# Patient Record
Sex: FEMALE | Race: BLACK OR AFRICAN AMERICAN | NOT HISPANIC OR LATINO | Employment: OTHER | URBAN - METROPOLITAN AREA
[De-identification: names, ages, dates, MRNs, and addresses within clinical notes are randomized per-mention and may not be internally consistent; named-entity substitution may affect disease eponyms.]

---

## 2020-06-11 ENCOUNTER — OFFICE VISIT (OUTPATIENT)
Dept: PODIATRY | Facility: CLINIC | Age: 70
End: 2020-06-11
Payer: MEDICARE

## 2020-06-11 VITALS
SYSTOLIC BLOOD PRESSURE: 137 MMHG | BODY MASS INDEX: 30.82 KG/M2 | HEART RATE: 86 BPM | HEIGHT: 66 IN | WEIGHT: 191.81 LBS | DIASTOLIC BLOOD PRESSURE: 77 MMHG

## 2020-06-11 DIAGNOSIS — M24.572 EQUINUS CONTRACTURE OF LEFT ANKLE: ICD-10-CM

## 2020-06-11 DIAGNOSIS — M79.672 INFLAMMATORY HEEL PAIN, LEFT: ICD-10-CM

## 2020-06-11 DIAGNOSIS — M72.2 PLANTAR FASCIITIS: Primary | ICD-10-CM

## 2020-06-11 PROBLEM — K21.9 GERD (GASTROESOPHAGEAL REFLUX DISEASE): Status: ACTIVE | Noted: 2020-06-11

## 2020-06-11 PROBLEM — I10 HYPERTENSION: Status: ACTIVE | Noted: 2020-06-11

## 2020-06-11 PROBLEM — H26.9 CATARACT: Status: ACTIVE | Noted: 2020-06-11

## 2020-06-11 PROCEDURE — 1125F AMNT PAIN NOTED PAIN PRSNT: CPT | Mod: S$GLB,,, | Performed by: PODIATRIST

## 2020-06-11 PROCEDURE — 99204 OFFICE O/P NEW MOD 45 MIN: CPT | Mod: S$GLB,,, | Performed by: PODIATRIST

## 2020-06-11 PROCEDURE — 1125F PR PAIN SEVERITY QUANTIFIED, PAIN PRESENT: ICD-10-PCS | Mod: S$GLB,,, | Performed by: PODIATRIST

## 2020-06-11 PROCEDURE — 99204 PR OFFICE/OUTPT VISIT, NEW, LEVL IV, 45-59 MIN: ICD-10-PCS | Mod: S$GLB,,, | Performed by: PODIATRIST

## 2020-06-11 PROCEDURE — 1101F PR PT FALLS ASSESS DOC 0-1 FALLS W/OUT INJ PAST YR: ICD-10-PCS | Mod: CPTII,S$GLB,, | Performed by: PODIATRIST

## 2020-06-11 PROCEDURE — 1159F MED LIST DOCD IN RCRD: CPT | Mod: S$GLB,,, | Performed by: PODIATRIST

## 2020-06-11 PROCEDURE — 99999 PR PBB SHADOW E&M-NEW PATIENT-LVL III: CPT | Mod: PBBFAC,,, | Performed by: PODIATRIST

## 2020-06-11 PROCEDURE — 1159F PR MEDICATION LIST DOCUMENTED IN MEDICAL RECORD: ICD-10-PCS | Mod: S$GLB,,, | Performed by: PODIATRIST

## 2020-06-11 PROCEDURE — 1101F PT FALLS ASSESS-DOCD LE1/YR: CPT | Mod: CPTII,S$GLB,, | Performed by: PODIATRIST

## 2020-06-11 PROCEDURE — 99999 PR PBB SHADOW E&M-NEW PATIENT-LVL III: ICD-10-PCS | Mod: PBBFAC,,, | Performed by: PODIATRIST

## 2020-06-11 RX ORDER — SIMVASTATIN 5 MG/1
5 TABLET, FILM COATED ORAL
COMMUNITY
Start: 2020-06-03

## 2020-06-11 RX ORDER — MELOXICAM 15 MG/1
15 TABLET ORAL DAILY
Qty: 30 TABLET | Refills: 0 | Status: SHIPPED | OUTPATIENT
Start: 2020-06-11 | End: 2020-07-02

## 2020-06-11 RX ORDER — FLUTICASONE PROPIONATE 50 MCG
50 SPRAY, SUSPENSION (ML) NASAL
COMMUNITY
Start: 2020-04-08 | End: 2023-02-02

## 2020-06-11 NOTE — PROGRESS NOTES
Subjective:     Patient ID: Basia ERNEE is a 70 y.o. female.    Chief Complaint: Foot Pain (c/o pain to bottom of left heel. rates pain 7/10,  while standing. throbbing and shooting pain. wears causal shoes with socks. non-diabetic Pt. PCP Dr. Cabello, last seen one week ago.)    Basia is a 70 y.o. female who presents to the clinic complaining of heel pain in the left foot, especially with the first step in the morning. The pain is described as Aching, Throbbing and Shooting. The onset of the pain was gradual and has worsened over the past several months. Basia rates the pain as 7/10. She denies a history of trauma. Prior treatments include oral steroid pack with no relief. Patient has no other pedal complaints today.     Patient Active Problem List   Diagnosis    GERD (gastroesophageal reflux disease)    Hypertension    Cataract       Review of patient's allergies indicates:  No Known Allergies    History reviewed. No pertinent surgical history.    Family History   Problem Relation Age of Onset    Hypertension Mother     Hypertension Father        Social History     Socioeconomic History    Marital status:      Spouse name: Not on file    Number of children: Not on file    Years of education: Not on file    Highest education level: Not on file   Occupational History    Not on file   Social Needs    Financial resource strain: Not on file    Food insecurity:     Worry: Not on file     Inability: Not on file    Transportation needs:     Medical: Not on file     Non-medical: Not on file   Tobacco Use    Smoking status: Never Smoker    Smokeless tobacco: Never Used   Substance and Sexual Activity    Alcohol use: Not Currently     Frequency: Never    Drug use: Never    Sexual activity: Not on file   Lifestyle    Physical activity:     Days per week: Not on file     Minutes per session: Not on file    Stress: Not on file   Relationships    Social connections:     Talks on phone: Not on  "file     Gets together: Not on file     Attends [employer] service: Not on file     Active member of club or organization: Not on file     Attends meetings of clubs or organizations: Not on file     Relationship status: Not on file   Other Topics Concern    Not on file   Social History Narrative    Not on file       Vitals:    06/11/20 1540   BP: 137/77   Pulse: 86   Weight: 87 kg (191 lb 12.8 oz)   Height: 5' 6" (1.676 m)   PainSc:   7   PainLoc: Foot       Review of Systems   Constitutional: Negative for chills and fever.   Respiratory: Negative for shortness of breath.    Cardiovascular: Negative for chest pain, palpitations, orthopnea, claudication and leg swelling.   Gastrointestinal: Negative for diarrhea, nausea and vomiting.   Musculoskeletal: Negative for joint pain.   Skin: Negative for rash.   Neurological: Negative for dizziness, tingling, sensory change, focal weakness and weakness.   Psychiatric/Behavioral: Negative.          Objective:   PHYSICAL EXAM: Apperance: Alert and orient in no distress,well developed, and with good attention to grooming and body habits  Patient presents ambulating in casual shoes.  Lower Extremity Exam  VASCULAR: Dorsalis pedis pulses 2/4 bilateral and Posterior Tibial pulses 2/4 bilateral. Capillary fill time <4 seconds bilateral. No edema observed bilateral. Varicosities absent bilateral. Skin temperature of the lower extremities is warm to warm, proximal to distal. Hair growth WNL bilateral.  DERMATOLOGICAL: No skin rashes, subcutaneous nodules, lesions, or ulcers observed bilateral.   NEUROLOGICAL: Light touch, sharp-dull, proprioception all present and equal bilaterally.    MUSCULOSKELETAL: Muscle strength 5/5 for all foot inverters, everters, plantarflexors, and dorsiflexors bilateral. Ankle joints bilateral shows decreased ROM. bilateral ankle ROM shows greater decrease in dorsiflexion with knee extended. Ankle joint ROM is pain free and without crepitus bilateral. " Pain to palpation left plantar medial tubercle. Plantar medial aspect of left heels shows tenderness to palpation. No pain on medial-lateral compression of the calcaneus.         Assessment:   The following prescriptions/orders were written today per listed diagnosis  Plantar fasciitis - Left Foot  -     meloxicam (MOBIC) 15 MG tablet; Take 1 tablet (15 mg total) by mouth once daily.  Dispense: 30 tablet; Refill: 0    Inflammatory heel pain, left - Left Foot    Equinus contracture of left ankle - Left Foot          Plan:   Plantar fasciitis - Left Foot  -     meloxicam (MOBIC) 15 MG tablet; Take 1 tablet (15 mg total) by mouth once daily.  Dispense: 30 tablet; Refill: 0    Inflammatory heel pain, left - Left Foot    Equinus contracture of left ankle - Left Foot      I counseled the patient on her conditions, regarding findings of my examination, my impressions, and usual treatment plan.   I explained to the patient that etiology and treatment options for heel pain including rest,  ice messages, stretching exercises, strappings/tappings, NSAID's, injections, new shoegear with orthotic inserts, and/or surgical treatment.   Patient agreed to oral anti-inflammatory, stretching exercises, and shoes/inserts.   I gave written and verbal instructions on heel cord stretching and this was demonstrated for the patient. Patient expressed understanding.  \Prescribed Meloxicam 15mg to be taken once daily with food for the next 7 days and then as needed for inflammation. Patient advised on the possible elevation of blood pressure or heart effects and caution to take pills as needed and to discontinue use if symptoms arise, patient agreed.  \Patient instructed on adequate icing techniques. Patient should ice the affected area at least once per day x 10 minutes for 10 days . I advised the  patient that extra icing would also be beneficial to ensure adequate anti inflammatory effect.   The patient and I reviewed the types of shoes  she should be wearing, my recommendation includes generally the best time of the day for a shoe fitting is the afternoon, shoes with a wide toe box, very good cushion, and tennis shoes with removable inner soles. The patient and I reviewed my recommendations for over-the-counter orthotic inserts.   Patient to return in 3 weeks or sooner if needed.                   Angelia Ndiaye DPM  [facility]

## 2020-06-11 NOTE — PATIENT INSTRUCTIONS
Understanding Plantar Fasciitis    Plantar fasciitis is a condition that causes foot and heel pain. The plantar fascia is a tough band of tissue that runs across the bottom of the foot from the heel to the toes. This tissue pulls on the heel bone. It supports the arch of the foot as it pushes off the ground. If the tissue becomes irritated or red and swollen (inflamed), it is called plantar fasciitis.  How to say it  PLAN-tuhr [id number]-IY-[id number]   What causes plantar fasciitis?  Plantar fasciitis most often occurs from overusing the plantar fascia. The tissue may become damaged from activities that put repeated stress on the heel and foot. Or it may wear down over time with age and ankle stiffness. You are more likely to have plantar fasciitis if you:  · Do activities that require a lot of running, jumping, or dancing  · Have a job that requires being on your feet for long periods  · Are overweight or obese  · Have certain foot problems, such as a tight Achilles tendon, flat feet, or high arches  · Often wear poorly fitting shoes  Symptoms of plantar fasciitis  The condition most often causes pain in the heel and the bottom of the foot. The pain may occur when you take your first steps in the morning. It may get better as you walk throughout the day. But as you continue to put weight on the foot, the pain often returns. Pain may also occur after standing or sitting for long periods.  Treating plantar fasciitis  Treatments for plantar fasciitis include:  · Resting the foot. This involves limiting movements that make your foot hurt. You may also need to avoid certain sports and types of work for a time.  · Using cold packs. Put an ice pack on the heel and foot to help reduce pain and swelling.  · Taking pain medicines. Prescription and over-the-counter pain medicines can help relieve pain and swelling.  · Using heel cups or foot inserts (orthotics). These are placed in the shoes to help support the heel or arch and  cushion the heel. You may also be told to buy proper-fitting shoes with good arch support and cushioned soles.  · Taping the foot. This supports the arch and limits the movement of the plantar fascia to help relieve symptoms.  · Wearing a night splint. This stretches the plantar fascia and leg muscles while you sleep. This may help relieve pain.  · Doing exercises and physical therapy. These stretch and strengthen the plantar fascia and the muscles in the leg that support the heel and foot.  · Getting shots of medicine into the foot. These may help relieve symptoms for a time.  · Having surgery. This may be needed if other treatments fail to relieve symptoms. During surgery, the surgeon may partially cut the plantar fascia to release tension.  Possible complications of plantar fasciitis  Without proper care and treatment, healing may take longer than normal. Also, symptoms may continue or get worse. Over time, the plantar fascia may be damaged. This can make it hard to walk or even stand without pain.  When to call your healthcare provider  Call your healthcare provider right away if you have any of these:  · Fever of 100.4°F (38°C) or higher, or as directed  · Symptoms that dont get better with treatment, or get worse  · New symptoms, such as numbness, tingling, or weakness in the foot   Date Last Reviewed: 3/10/2016  © 3166-9129 [employer]. [address] [place] [address], [place] [address]. All rights reserved. This information is not intended as a substitute for professional medical care. Always follow your healthcare professional's instructions.

## 2020-06-30 ENCOUNTER — OFFICE VISIT (OUTPATIENT)
Dept: PODIATRY | Facility: CLINIC | Age: 70
End: 2020-06-30
Payer: MEDICARE

## 2020-06-30 VITALS
SYSTOLIC BLOOD PRESSURE: 149 MMHG | DIASTOLIC BLOOD PRESSURE: 83 MMHG | HEIGHT: 66 IN | WEIGHT: 191 LBS | BODY MASS INDEX: 30.7 KG/M2 | HEART RATE: 80 BPM

## 2020-06-30 DIAGNOSIS — M24.572 EQUINUS CONTRACTURE OF LEFT ANKLE: ICD-10-CM

## 2020-06-30 DIAGNOSIS — M72.2 PLANTAR FASCIITIS: Primary | ICD-10-CM

## 2020-06-30 PROCEDURE — 1159F MED LIST DOCD IN RCRD: CPT | Mod: S$GLB,,, | Performed by: PODIATRIST

## 2020-06-30 PROCEDURE — 1125F AMNT PAIN NOTED PAIN PRSNT: CPT | Mod: S$GLB,,, | Performed by: PODIATRIST

## 2020-06-30 PROCEDURE — 99213 PR OFFICE/OUTPT VISIT, EST, LEVL III, 20-29 MIN: ICD-10-PCS | Mod: S$GLB,,, | Performed by: PODIATRIST

## 2020-06-30 PROCEDURE — 3008F BODY MASS INDEX DOCD: CPT | Mod: CPTII,S$GLB,, | Performed by: PODIATRIST

## 2020-06-30 PROCEDURE — 3008F PR BODY MASS INDEX (BMI) DOCUMENTED: ICD-10-PCS | Mod: CPTII,S$GLB,, | Performed by: PODIATRIST

## 2020-06-30 PROCEDURE — 99999 PR PBB SHADOW E&M-EST. PATIENT-LVL III: ICD-10-PCS | Mod: PBBFAC,,, | Performed by: PODIATRIST

## 2020-06-30 PROCEDURE — 99999 PR PBB SHADOW E&M-EST. PATIENT-LVL III: CPT | Mod: PBBFAC,,, | Performed by: PODIATRIST

## 2020-06-30 PROCEDURE — 1159F PR MEDICATION LIST DOCUMENTED IN MEDICAL RECORD: ICD-10-PCS | Mod: S$GLB,,, | Performed by: PODIATRIST

## 2020-06-30 PROCEDURE — 1101F PT FALLS ASSESS-DOCD LE1/YR: CPT | Mod: CPTII,S$GLB,, | Performed by: PODIATRIST

## 2020-06-30 PROCEDURE — 99213 OFFICE O/P EST LOW 20 MIN: CPT | Mod: S$GLB,,, | Performed by: PODIATRIST

## 2020-06-30 PROCEDURE — 1125F PR PAIN SEVERITY QUANTIFIED, PAIN PRESENT: ICD-10-PCS | Mod: S$GLB,,, | Performed by: PODIATRIST

## 2020-06-30 PROCEDURE — 1101F PR PT FALLS ASSESS DOC 0-1 FALLS W/OUT INJ PAST YR: ICD-10-PCS | Mod: CPTII,S$GLB,, | Performed by: PODIATRIST

## 2020-06-30 NOTE — PROGRESS NOTES
Subjective:     Patient ID: Basia RENEE is a 70 y.o. female.    Chief Complaint: Plantar Fasciitis (c/o pain to left heel. rates pain 1/10. wears tennis shoe with socks. non-diabetic Pt. PCP Dr. Cabello.)    Basia is a 70 y.o. female who presents to the podiatry clinic for follow up left foot pain. Patient states pain is much better. Patient states the inserts and tennis shoes help. Patient admits performing the stretching and icing exercises daily. Patient also states she takes the Mobic. Patient rates pain 1/10. Patient has no other pedal complaints at this times.    Patient Active Problem List   Diagnosis    GERD (gastroesophageal reflux disease)    Hypertension    Cataract       Medication List with Changes/Refills   Current Medications    FLUTICASONE PROPIONATE (FLONASE) 50 MCG/ACTUATION NASAL SPRAY    Take 50 mcg by mouth.    MELOXICAM (MOBIC) 15 MG TABLET    Take 1 tablet (15 mg total) by mouth once daily.    MOEXIPRIL-HYDROCHLOROTHIAZIDE (UNIRETIC) 7.5-12.5 MG PER TABLET    12.5 mg.    SIMVASTATIN (ZOCOR) 5 MG TABLET    Take 5 mg by mouth.       Review of patient's allergies indicates:  No Known Allergies    History reviewed. No pertinent surgical history.    Family History   Problem Relation Age of Onset    Hypertension Mother     Hypertension Father        Social History     Socioeconomic History    Marital status:      Spouse name: Not on file    Number of children: Not on file    Years of education: Not on file    Highest education level: Not on file   Occupational History    Not on file   Social Needs    Financial resource strain: Not on file    Food insecurity     Worry: Not on file     Inability: Not on file    Transportation needs     Medical: Not on file     Non-medical: Not on file   Tobacco Use    Smoking status: Never Smoker    Smokeless tobacco: Never Used   Substance and Sexual Activity    Alcohol use: Not Currently     Frequency: Never    Drug use: Never    Sexual  "activity: Not on file   Lifestyle    Physical activity     Days per week: Not on file     Minutes per session: Not on file    Stress: Not on file   Relationships    Social connections     Talks on phone: Not on file     Gets together: Not on file     Attends Jew service: Not on file     Active member of club or organization: Not on file     Attends meetings of clubs or organizations: Not on file     Relationship status: Not on file   Other Topics Concern    Not on file   Social History Narrative    Not on file       Vitals:    06/30/20 1355 06/30/20 1416   BP: (!) 158/80 (!) 149/83   Pulse: 80    Weight: 86.6 kg (191 lb)    Height: 5' 6" (1.676 m)    PainSc:   1    PainLoc: Foot          Review of Systems   Constitutional: Negative for chills and fever.   Respiratory: Negative for shortness of breath.    Cardiovascular: Negative for chest pain, palpitations, orthopnea, claudication and leg swelling.   Gastrointestinal: Negative for diarrhea, nausea and vomiting.   Musculoskeletal: Negative for joint pain.   Skin: Negative for rash.   Neurological: Negative for dizziness, tingling, sensory change, focal weakness and weakness.   Psychiatric/Behavioral: Negative.              Objective:      PHYSICAL EXAM: Apperance: Alert and orient in no distress,well developed, and with good attention to grooming and body habits  Patient presents ambulating in casual shoes.  Lower Extremity Exam  VASCULAR: Dorsalis pedis pulses 2/4 bilateral and Posterior Tibial pulses 2/4 bilateral. Capillary fill time <4 seconds bilateral. No edema observed bilateral. Varicosities absent bilateral. Skin temperature of the lower extremities is warm to warm, proximal to distal. Hair growth WNL bilateral.  DERMATOLOGICAL: No skin rashes, subcutaneous nodules, lesions, or ulcers observed bilateral.   NEUROLOGICAL: Light touch, sharp-dull, proprioception all present and equal bilaterally.    MUSCULOSKELETAL: Muscle strength 5/5 for all foot " inverters, everters, plantarflexors, and dorsiflexors bilateral. Ankle joints bilateral shows decreased ROM. bilateral ankle ROM shows greater decrease in dorsiflexion with knee extended. Ankle joint ROM is pain free and without crepitus bilateral. No pain to palpation left plantar medial tubercle. Plantar medial aspect of left heels shows tenderness to palpation. No pain on medial-lateral compression of the calcaneus.        Assessment:       Encounter Diagnoses   Name Primary?    Plantar fasciitis - Left Foot Yes    Equinus contracture of left ankle - Left Foot          Plan:   Plantar fasciitis - Left Foot    Equinus contracture of left ankle - Left Foot      I counseled the patient on her conditions, regarding findings of my examination, my impressions, and usual treatment plan.   Patient instructed to continue the stretching exercises.   Patient instructed to take Mobic as needed.   Patient to return as needed.             Angelia Ndiaye DPM  [facility]

## 2021-12-07 ENCOUNTER — OFFICE VISIT (OUTPATIENT)
Dept: PODIATRY | Facility: CLINIC | Age: 71
End: 2021-12-07
Payer: MEDICARE

## 2021-12-07 VITALS — HEIGHT: 66 IN | BODY MASS INDEX: 30.7 KG/M2 | WEIGHT: 191 LBS

## 2021-12-07 DIAGNOSIS — B35.3 TINEA PEDIS OF BOTH FEET: Primary | ICD-10-CM

## 2021-12-07 DIAGNOSIS — R23.4 FISSURE IN SKIN OF BOTH FEET: ICD-10-CM

## 2021-12-07 PROCEDURE — 99214 OFFICE O/P EST MOD 30 MIN: CPT | Mod: S$GLB,,, | Performed by: PODIATRIST

## 2021-12-07 PROCEDURE — 99999 PR PBB SHADOW E&M-EST. PATIENT-LVL III: CPT | Mod: PBBFAC,,, | Performed by: PODIATRIST

## 2021-12-07 PROCEDURE — 99999 PR PBB SHADOW E&M-EST. PATIENT-LVL III: ICD-10-PCS | Mod: PBBFAC,,, | Performed by: PODIATRIST

## 2021-12-07 PROCEDURE — 99214 PR OFFICE/OUTPT VISIT, EST, LEVL IV, 30-39 MIN: ICD-10-PCS | Mod: S$GLB,,, | Performed by: PODIATRIST

## 2021-12-07 RX ORDER — TERBINAFINE HYDROCHLORIDE 250 MG/1
250 TABLET ORAL DAILY
Qty: 14 TABLET | Refills: 0 | Status: SHIPPED | OUTPATIENT
Start: 2021-12-07 | End: 2023-02-02

## 2021-12-28 ENCOUNTER — PATIENT MESSAGE (OUTPATIENT)
Dept: PODIATRY | Facility: CLINIC | Age: 71
End: 2021-12-28
Payer: MEDICARE

## 2021-12-29 ENCOUNTER — PATIENT MESSAGE (OUTPATIENT)
Dept: PODIATRY | Facility: CLINIC | Age: 71
End: 2021-12-29
Payer: MEDICARE

## 2022-01-04 ENCOUNTER — PATIENT MESSAGE (OUTPATIENT)
Dept: PODIATRY | Facility: CLINIC | Age: 72
End: 2022-01-04
Payer: MEDICARE

## 2022-01-24 ENCOUNTER — TELEPHONE (OUTPATIENT)
Dept: PODIATRY | Facility: CLINIC | Age: 72
End: 2022-01-24
Payer: MEDICARE

## 2022-01-24 NOTE — TELEPHONE ENCOUNTER
Called patient to reschedule Podiatry appointment on  02-01-22 due to book out. No answer, left voice message requesting call back.

## 2022-01-27 ENCOUNTER — PATIENT MESSAGE (OUTPATIENT)
Dept: PODIATRY | Facility: CLINIC | Age: 72
End: 2022-01-27
Payer: MEDICARE

## 2022-02-14 ENCOUNTER — OFFICE VISIT (OUTPATIENT)
Dept: PODIATRY | Facility: CLINIC | Age: 72
End: 2022-02-14
Payer: MEDICARE

## 2022-02-14 VITALS — BODY MASS INDEX: 30.69 KG/M2 | WEIGHT: 190.94 LBS | HEIGHT: 66 IN

## 2022-02-14 DIAGNOSIS — R23.4 FISSURE IN SKIN OF BOTH FEET: ICD-10-CM

## 2022-02-14 DIAGNOSIS — B35.3 TINEA PEDIS OF BOTH FEET: Primary | ICD-10-CM

## 2022-02-14 PROCEDURE — 3008F PR BODY MASS INDEX (BMI) DOCUMENTED: ICD-10-PCS | Mod: CPTII,S$GLB,, | Performed by: PODIATRIST

## 2022-02-14 PROCEDURE — 99213 OFFICE O/P EST LOW 20 MIN: CPT | Mod: S$GLB,,, | Performed by: PODIATRIST

## 2022-02-14 PROCEDURE — 99999 PR PBB SHADOW E&M-EST. PATIENT-LVL III: ICD-10-PCS | Mod: PBBFAC,,, | Performed by: PODIATRIST

## 2022-02-14 PROCEDURE — 1160F PR REVIEW ALL MEDS BY PRESCRIBER/CLIN PHARMACIST DOCUMENTED: ICD-10-PCS | Mod: CPTII,S$GLB,, | Performed by: PODIATRIST

## 2022-02-14 PROCEDURE — 1159F PR MEDICATION LIST DOCUMENTED IN MEDICAL RECORD: ICD-10-PCS | Mod: CPTII,S$GLB,, | Performed by: PODIATRIST

## 2022-02-14 PROCEDURE — 3288F FALL RISK ASSESSMENT DOCD: CPT | Mod: CPTII,S$GLB,, | Performed by: PODIATRIST

## 2022-02-14 PROCEDURE — 1101F PT FALLS ASSESS-DOCD LE1/YR: CPT | Mod: CPTII,S$GLB,, | Performed by: PODIATRIST

## 2022-02-14 PROCEDURE — 3008F BODY MASS INDEX DOCD: CPT | Mod: CPTII,S$GLB,, | Performed by: PODIATRIST

## 2022-02-14 PROCEDURE — 99999 PR PBB SHADOW E&M-EST. PATIENT-LVL III: CPT | Mod: PBBFAC,,, | Performed by: PODIATRIST

## 2022-02-14 PROCEDURE — 99213 PR OFFICE/OUTPT VISIT, EST, LEVL III, 20-29 MIN: ICD-10-PCS | Mod: S$GLB,,, | Performed by: PODIATRIST

## 2022-02-14 PROCEDURE — 3288F PR FALLS RISK ASSESSMENT DOCUMENTED: ICD-10-PCS | Mod: CPTII,S$GLB,, | Performed by: PODIATRIST

## 2022-02-14 PROCEDURE — 1101F PR PT FALLS ASSESS DOC 0-1 FALLS W/OUT INJ PAST YR: ICD-10-PCS | Mod: CPTII,S$GLB,, | Performed by: PODIATRIST

## 2022-02-14 PROCEDURE — 1160F RVW MEDS BY RX/DR IN RCRD: CPT | Mod: CPTII,S$GLB,, | Performed by: PODIATRIST

## 2022-02-14 PROCEDURE — 1159F MED LIST DOCD IN RCRD: CPT | Mod: CPTII,S$GLB,, | Performed by: PODIATRIST

## 2022-02-14 RX ORDER — AZELASTINE 1 MG/ML
1 SPRAY, METERED NASAL
COMMUNITY
Start: 2021-10-01 | End: 2023-02-02

## 2022-02-14 RX ORDER — CETIRIZINE HYDROCHLORIDE 10 MG/1
10 TABLET ORAL
COMMUNITY
Start: 2021-10-01 | End: 2023-02-02

## 2022-02-18 ENCOUNTER — PATIENT MESSAGE (OUTPATIENT)
Dept: PODIATRY | Facility: CLINIC | Age: 72
End: 2022-02-18
Payer: MEDICARE

## 2022-02-22 NOTE — PROGRESS NOTES
"Subjective:     Patient ID: Basia RENEE is a 71 y.o. female.    Chief Complaint: Follow-up (6wk tinea f/u, non-diabetic pt wears tennis shoes ,PCP Dr. Cabello last seen 9-27-21)    HPI: This 71 year old female presents today for follow up of fungus. Patient states she has been applying the topical compound creams as instructed with more relief for the itching but still has the cracks in heels. Patient denies other complaints at this time.    Patient Active Problem List   Diagnosis    GERD (gastroesophageal reflux disease)    Hypertension    Cataract       Medication List with Changes/Refills   Current Medications    AZELASTINE (ASTELIN) 137 MCG (0.1 %) NASAL SPRAY    1 spray by Nasal route.    CETIRIZINE (ZYRTEC) 10 MG TABLET    Take 10 mg by mouth.    FLUTICASONE PROPIONATE (FLONASE) 50 MCG/ACTUATION NASAL SPRAY    Take 50 mcg by mouth.    MELOXICAM (MOBIC) 15 MG TABLET    TAKE 1 TABLET BY MOUTH EVERY DAY    MOEXIPRIL-HYDROCHLOROTHIAZIDE (UNIRETIC) 7.5-12.5 MG PER TABLET    12.5 mg.    SIMVASTATIN (ZOCOR) 5 MG TABLET    Take 5 mg by mouth.    TERBINAFINE HCL (LAMISIL) 250 MG TABLET    Take 1 tablet (250 mg total) by mouth once daily.       Review of patient's allergies indicates:  No Known Allergies    History reviewed. No pertinent surgical history.    Family History   Problem Relation Age of Onset    Hypertension Mother     Hypertension Father        Social History     Socioeconomic History    Marital status:    Tobacco Use    Smoking status: Never Smoker    Smokeless tobacco: Never Used   Substance and Sexual Activity    Alcohol use: Not Currently    Drug use: Never       Vitals:    02/14/22 1438   Weight: 86.6 kg (190 lb 14.7 oz)   Height: 5' 6" (1.676 m)         Review of Systems   Constitutional: Negative for chills and fever.   Cardiovascular: Negative.    Gastrointestinal: Negative for diarrhea, nausea and vomiting.   Skin: Positive for rash.   Neurological: Negative.  "   Endo/Heme/Allergies: Negative.    Psychiatric/Behavioral: Negative.              Objective:      PHYSICAL EXAM: Apperance: Alert and orient in no distress,well developed, and with good attention to grooming and body habits  Patient presents ambulating in tennis shoes.   Lower Extremity Exam  VASCULAR: Dorsalis pedis pulses 2/4 bilateral and Posterior Tibial pulses 2/4 bilateral. Capillary fill time <4 seconds bilateral. No edema observed bilateral. Skin temperature of the lower extremities is warm to warm, proximal to distal. Hair growth WNL bilateral.  DERMATOLOGICAL: No skin rash, subcutaneous nodules, lesions or ulcers observed. Decreased dry and scaly skin noted plantarly bilateral in moccasin distribution. Dry skin fissures noted to bilateral heels. Webspaces 1,2,3 bilateral are clean, dry and without evidence of break in skin integrity. Webspaces 4 bilateral minimal maceration noted.   NEUROLOGICAL: Light touch, sharp-dull, proprioception all present and equal bilaterally.    MUSCULOSKELETAL: Muscle strength is 5/5 for foot inverters, everters, plantarflexors, and dorsiflexors. Muscle tone is normal.         Assessment:       Encounter Diagnoses   Name Primary?    Tinea pedis of both feet Yes    Fissure in skin of both feet          Plan:   Tinea pedis of both feet    Fissure in skin of both feet      I counseled the patient on her conditions, regarding findings of my examination, my impressions, and usual treatment plan.   Discuss treatment options for tines pedis.  I explained that fungus lives in a warm dark moist environment and therefore patient should make every attempt to keep feet clean and dry.  We discussed drying feet thoroughly after shower particularly between the toes.   Patient instructed to spray all shoes with Lysol disinfectant spray and let dry before wearing. Patient instructed to wash all socks in hot water and bleach.  We discussed using Lamisil for tinea pedis. This drug offers a  fairly high but not universal cure rate. A 2-4 week treatment course is recommended. The patient is aware that rare cases of liver injury have been reported; and agrees to have a liver function tests performed. The symptoms of liver disease have been discussed; call if such occurs. Other side effects, such as headaches and rashes, have also been discussed.  Refill for topical compound foot gel/cream/soak consisting of Hydrocortisone, Iodoquinol, and Aloe.  Patient instructed to use gel/cream/soak once daily. Discussed with patient that there may be out of pocket cost of foot gel/cream/soak pending insurance, and that they may refuse medication if cost is an issue. Patient states they understand.  Patient to return in 6 weeks or sooner if needed.               Angelia Ndiaye DPM  [facility]

## 2022-03-21 ENCOUNTER — PATIENT MESSAGE (OUTPATIENT)
Dept: PODIATRY | Facility: CLINIC | Age: 72
End: 2022-03-21
Payer: MEDICARE

## 2022-03-22 ENCOUNTER — PATIENT MESSAGE (OUTPATIENT)
Dept: PODIATRY | Facility: CLINIC | Age: 72
End: 2022-03-22
Payer: MEDICARE

## 2022-03-29 ENCOUNTER — OFFICE VISIT (OUTPATIENT)
Dept: PODIATRY | Facility: CLINIC | Age: 72
End: 2022-03-29
Payer: MEDICARE

## 2022-03-29 VITALS — HEIGHT: 66 IN | BODY MASS INDEX: 30.69 KG/M2 | WEIGHT: 190.94 LBS

## 2022-03-29 DIAGNOSIS — B35.3 TINEA PEDIS OF BOTH FEET: Primary | ICD-10-CM

## 2022-03-29 DIAGNOSIS — R23.4 FISSURE IN SKIN OF BOTH FEET: ICD-10-CM

## 2022-03-29 PROCEDURE — 1101F PT FALLS ASSESS-DOCD LE1/YR: CPT | Mod: CPTII,S$GLB,, | Performed by: PODIATRIST

## 2022-03-29 PROCEDURE — 1160F PR REVIEW ALL MEDS BY PRESCRIBER/CLIN PHARMACIST DOCUMENTED: ICD-10-PCS | Mod: CPTII,S$GLB,, | Performed by: PODIATRIST

## 2022-03-29 PROCEDURE — 99999 PR PBB SHADOW E&M-EST. PATIENT-LVL III: CPT | Mod: PBBFAC,,, | Performed by: PODIATRIST

## 2022-03-29 PROCEDURE — 99213 OFFICE O/P EST LOW 20 MIN: CPT | Mod: S$GLB,,, | Performed by: PODIATRIST

## 2022-03-29 PROCEDURE — 99213 PR OFFICE/OUTPT VISIT, EST, LEVL III, 20-29 MIN: ICD-10-PCS | Mod: S$GLB,,, | Performed by: PODIATRIST

## 2022-03-29 PROCEDURE — 3008F PR BODY MASS INDEX (BMI) DOCUMENTED: ICD-10-PCS | Mod: CPTII,S$GLB,, | Performed by: PODIATRIST

## 2022-03-29 PROCEDURE — 3288F FALL RISK ASSESSMENT DOCD: CPT | Mod: CPTII,S$GLB,, | Performed by: PODIATRIST

## 2022-03-29 PROCEDURE — 1159F MED LIST DOCD IN RCRD: CPT | Mod: CPTII,S$GLB,, | Performed by: PODIATRIST

## 2022-03-29 PROCEDURE — 3288F PR FALLS RISK ASSESSMENT DOCUMENTED: ICD-10-PCS | Mod: CPTII,S$GLB,, | Performed by: PODIATRIST

## 2022-03-29 PROCEDURE — 1101F PR PT FALLS ASSESS DOC 0-1 FALLS W/OUT INJ PAST YR: ICD-10-PCS | Mod: CPTII,S$GLB,, | Performed by: PODIATRIST

## 2022-03-29 PROCEDURE — 99999 PR PBB SHADOW E&M-EST. PATIENT-LVL III: ICD-10-PCS | Mod: PBBFAC,,, | Performed by: PODIATRIST

## 2022-03-29 PROCEDURE — 3008F BODY MASS INDEX DOCD: CPT | Mod: CPTII,S$GLB,, | Performed by: PODIATRIST

## 2022-03-29 PROCEDURE — 1159F PR MEDICATION LIST DOCUMENTED IN MEDICAL RECORD: ICD-10-PCS | Mod: CPTII,S$GLB,, | Performed by: PODIATRIST

## 2022-03-29 PROCEDURE — 1160F RVW MEDS BY RX/DR IN RCRD: CPT | Mod: CPTII,S$GLB,, | Performed by: PODIATRIST

## 2022-04-11 NOTE — PROGRESS NOTES
"Subjective:     Patient ID: Basia RENEE is a 72 y.o. female.    Chief Complaint: Follow-up (6wk BL tinea f/u, non-diabetic pt wears flats ,  PCP Dr. Molina last seen 9-27-21)    HPI: This 71 year old female presents today for follow up of fungus. Patient states she has been applying the topical compound creams as instructed with more relief for the itching but still has the cracks in heels. Patient denies other complaints at this time.    Patient Active Problem List   Diagnosis    GERD (gastroesophageal reflux disease)    Hypertension    Cataract       Medication List with Changes/Refills   Current Medications    AZELASTINE (ASTELIN) 137 MCG (0.1 %) NASAL SPRAY    1 spray by Nasal route.    CETIRIZINE (ZYRTEC) 10 MG TABLET    Take 10 mg by mouth.    FLUTICASONE PROPIONATE (FLONASE) 50 MCG/ACTUATION NASAL SPRAY    Take 50 mcg by mouth.    MELOXICAM (MOBIC) 15 MG TABLET    TAKE 1 TABLET BY MOUTH EVERY DAY    MOEXIPRIL-HYDROCHLOROTHIAZIDE (UNIRETIC) 7.5-12.5 MG PER TABLET    12.5 mg.    SIMVASTATIN (ZOCOR) 5 MG TABLET    Take 5 mg by mouth.    TERBINAFINE HCL (LAMISIL) 250 MG TABLET    Take 1 tablet (250 mg total) by mouth once daily.       Review of patient's allergies indicates:  No Known Allergies    History reviewed. No pertinent surgical history.    Family History   Problem Relation Age of Onset    Hypertension Mother     Hypertension Father        Social History     Socioeconomic History    Marital status:    Tobacco Use    Smoking status: Never Smoker    Smokeless tobacco: Never Used   Substance and Sexual Activity    Alcohol use: Not Currently    Drug use: Never       Vitals:    03/29/22 1456   Weight: 86.6 kg (190 lb 14.7 oz)   Height: 5' 6" (1.676 m)         Review of Systems   Constitutional: Negative for chills and fever.   Cardiovascular: Negative.    Gastrointestinal: Negative for diarrhea, nausea and vomiting.   Skin: Positive for rash.   Neurological: Negative.    Endo/Heme/Allergies: " Negative.    Psychiatric/Behavioral: Negative.          Objective:      PHYSICAL EXAM: Apperance: Alert and orient in no distress,well developed, and with good attention to grooming and body habits  Patient presents ambulating in tennis shoes.   Lower Extremity Exam  VASCULAR: Dorsalis pedis pulses 2/4 bilateral and Posterior Tibial pulses 2/4 bilateral. Capillary fill time <4 seconds bilateral. No edema observed bilateral. Skin temperature of the lower extremities is warm to warm, proximal to distal. Hair growth WNL bilateral.  DERMATOLOGICAL: No skin rash, subcutaneous nodules, lesions or ulcers observed. Decreased dry and scaly skin noted plantarly bilateral in moccasin distribution. Dry skin fissures noted to bilateral heels. Webspaces 1,2,3 bilateral are clean, dry and without evidence of break in skin integrity. Webspaces 4 bilateral minimal maceration noted.   NEUROLOGICAL: Light touch, sharp-dull, proprioception all present and equal bilaterally.    MUSCULOSKELETAL: Muscle strength is 5/5 for foot inverters, everters, plantarflexors, and dorsiflexors. Muscle tone is normal.         Assessment:       Encounter Diagnoses   Name Primary?    Tinea pedis of both feet Yes    Fissure in skin of both feet          Plan:   Tinea pedis of both feet    Fissure in skin of both feet      I counseled the patient on her conditions, regarding findings of my examination, my impressions, and usual treatment plan.   Discuss treatment options for tines pedis.  I explained that fungus lives in a warm dark moist environment and therefore patient should make every attempt to keep feet clean and dry.  We discussed drying feet thoroughly after shower particularly between the toes.   Patient instructed to spray all shoes with Lysol disinfectant spray and let dry before wearing. Patient instructed to wash all socks in hot water and bleach.  We discussed using Lamisil for tinea pedis. This drug offers a fairly high but not universal  cure rate. A 2-4 week treatment course is recommended. The patient is aware that rare cases of liver injury have been reported; and agrees to have a liver function tests performed. The symptoms of liver disease have been discussed; call if such occurs. Other side effects, such as headaches and rashes, have also been discussed.  Patient to continue topical urea cream..   Discussed with patient that there may be out of pocket cost of foot gel/cream/soak pending insurance, and that they may refuse medication if cost is an issue. Patient states they understand.  Patient to return in 6 weeks or sooner if needed.               Angelia Ndiaye DPM  [facility]

## 2022-04-14 ENCOUNTER — PATIENT MESSAGE (OUTPATIENT)
Dept: PODIATRY | Facility: CLINIC | Age: 72
End: 2022-04-14
Payer: MEDICARE

## 2022-05-03 ENCOUNTER — OFFICE VISIT (OUTPATIENT)
Dept: PODIATRY | Facility: CLINIC | Age: 72
End: 2022-05-03
Payer: MEDICARE

## 2022-05-03 VITALS — BODY MASS INDEX: 30.69 KG/M2 | WEIGHT: 190.94 LBS | HEIGHT: 66 IN

## 2022-05-03 DIAGNOSIS — R23.4 FISSURE IN SKIN OF BOTH FEET: ICD-10-CM

## 2022-05-03 DIAGNOSIS — L60.9 DISEASE OF NAIL: Primary | ICD-10-CM

## 2022-05-03 DIAGNOSIS — B35.3 TINEA PEDIS OF BOTH FEET: ICD-10-CM

## 2022-05-03 PROCEDURE — 1160F PR REVIEW ALL MEDS BY PRESCRIBER/CLIN PHARMACIST DOCUMENTED: ICD-10-PCS | Mod: CPTII,S$GLB,, | Performed by: PODIATRIST

## 2022-05-03 PROCEDURE — 1160F RVW MEDS BY RX/DR IN RCRD: CPT | Mod: CPTII,S$GLB,, | Performed by: PODIATRIST

## 2022-05-03 PROCEDURE — 99999 PR PBB SHADOW E&M-EST. PATIENT-LVL III: CPT | Mod: PBBFAC,,, | Performed by: PODIATRIST

## 2022-05-03 PROCEDURE — 99213 OFFICE O/P EST LOW 20 MIN: CPT | Mod: S$GLB,,, | Performed by: PODIATRIST

## 2022-05-03 PROCEDURE — 1101F PT FALLS ASSESS-DOCD LE1/YR: CPT | Mod: CPTII,S$GLB,, | Performed by: PODIATRIST

## 2022-05-03 PROCEDURE — 99999 PR PBB SHADOW E&M-EST. PATIENT-LVL III: ICD-10-PCS | Mod: PBBFAC,,, | Performed by: PODIATRIST

## 2022-05-03 PROCEDURE — 99213 PR OFFICE/OUTPT VISIT, EST, LEVL III, 20-29 MIN: ICD-10-PCS | Mod: S$GLB,,, | Performed by: PODIATRIST

## 2022-05-03 PROCEDURE — 3008F BODY MASS INDEX DOCD: CPT | Mod: CPTII,S$GLB,, | Performed by: PODIATRIST

## 2022-05-03 PROCEDURE — 1101F PR PT FALLS ASSESS DOC 0-1 FALLS W/OUT INJ PAST YR: ICD-10-PCS | Mod: CPTII,S$GLB,, | Performed by: PODIATRIST

## 2022-05-03 PROCEDURE — 3288F PR FALLS RISK ASSESSMENT DOCUMENTED: ICD-10-PCS | Mod: CPTII,S$GLB,, | Performed by: PODIATRIST

## 2022-05-03 PROCEDURE — 1159F PR MEDICATION LIST DOCUMENTED IN MEDICAL RECORD: ICD-10-PCS | Mod: CPTII,S$GLB,, | Performed by: PODIATRIST

## 2022-05-03 PROCEDURE — 1159F MED LIST DOCD IN RCRD: CPT | Mod: CPTII,S$GLB,, | Performed by: PODIATRIST

## 2022-05-03 PROCEDURE — 87101 SKIN FUNGI CULTURE: CPT | Performed by: PODIATRIST

## 2022-05-03 PROCEDURE — 3008F PR BODY MASS INDEX (BMI) DOCUMENTED: ICD-10-PCS | Mod: CPTII,S$GLB,, | Performed by: PODIATRIST

## 2022-05-03 PROCEDURE — 3288F FALL RISK ASSESSMENT DOCD: CPT | Mod: CPTII,S$GLB,, | Performed by: PODIATRIST

## 2022-05-15 NOTE — PROGRESS NOTES
"Subjective:     Patient ID: Basia RENEE is a 72 y.o. female.    Chief Complaint: Follow-up (1mo BL tinea pedis f/u, non-diabetic pt wears tennis shoes w/ socks, PCP Dr. Cabello last seen 9-27-21)    HPI: This 72 year old female presents today for follow up of fungus. Patient states she has been applying the topical compound creams as instructed with more relief for the itching but still has the cracks in heels. Patient states she recently started use of cream from her dermatologist. Patient denies other complaints at this time.    Patient Active Problem List   Diagnosis    GERD (gastroesophageal reflux disease)    Hypertension    Cataract       Medication List with Changes/Refills   Current Medications    AZELASTINE (ASTELIN) 137 MCG (0.1 %) NASAL SPRAY    1 spray by Nasal route.    CETIRIZINE (ZYRTEC) 10 MG TABLET    Take 10 mg by mouth.    FLUTICASONE PROPIONATE (FLONASE) 50 MCG/ACTUATION NASAL SPRAY    Take 50 mcg by mouth.    MELOXICAM (MOBIC) 15 MG TABLET    TAKE 1 TABLET BY MOUTH EVERY DAY    MOEXIPRIL-HYDROCHLOROTHIAZIDE (UNIRETIC) 7.5-12.5 MG PER TABLET    12.5 mg.    SIMVASTATIN (ZOCOR) 5 MG TABLET    Take 5 mg by mouth.    TERBINAFINE HCL (LAMISIL) 250 MG TABLET    Take 1 tablet (250 mg total) by mouth once daily.       Review of patient's allergies indicates:  No Known Allergies    History reviewed. No pertinent surgical history.    Family History   Problem Relation Age of Onset    Hypertension Mother     Hypertension Father        Social History     Socioeconomic History    Marital status:    Tobacco Use    Smoking status: Never Smoker    Smokeless tobacco: Never Used   Substance and Sexual Activity    Alcohol use: Not Currently    Drug use: Never       Vitals:    05/03/22 1356   Weight: 86.6 kg (190 lb 14.7 oz)   Height: 5' 6" (1.676 m)         Review of Systems   Constitutional: Negative for chills and fever.   Cardiovascular: Negative.    Gastrointestinal: Negative for diarrhea, " nausea and vomiting.   Skin: Positive for rash.   Neurological: Negative.    Endo/Heme/Allergies: Negative.    Psychiatric/Behavioral: Negative.          Objective:      PHYSICAL EXAM: Apperance: Alert and orient in no distress,well developed, and with good attention to grooming and body habits  Patient presents ambulating in tennis shoes.   Lower Extremity Exam  VASCULAR: Dorsalis pedis pulses 2/4 bilateral and Posterior Tibial pulses 2/4 bilateral. Capillary fill time <4 seconds bilateral. No edema observed bilateral. Skin temperature of the lower extremities is warm to warm, proximal to distal. Hair growth WNL bilateral.  DERMATOLOGICAL: No skin rash, subcutaneous nodules, lesions or ulcers observed. Decreased dry and scaly skin noted plantarly bilateral in moccasin distribution. Decreased dry skin fissures noted to bilateral heels. Webspaces 1,2,3 bilateral are clean, dry and without evidence of break in skin integrity. Webspaces 4 bilateral minimal maceration noted. Nails thickened, and discolored with subungual debris.   NEUROLOGICAL: Light touch, sharp-dull, proprioception all present and equal bilaterally.    MUSCULOSKELETAL: Muscle strength is 5/5 for foot inverters, everters, plantarflexors, and dorsiflexors. Muscle tone is normal.         Assessment:       Encounter Diagnoses   Name Primary?    Disease of nail Yes    Tinea pedis of both feet     Fissure in skin of both feet          Plan:   Disease of nail  -     Fungal culture , skin, hair, or nails    Tinea pedis of both feet    Fissure in skin of both feet      I counseled the patient on her conditions, regarding findings of my examination, my impressions, and usual treatment plan.   Discuss treatment options for tines pedis.  I explained that fungus lives in a warm dark moist environment and therefore patient should make every attempt to keep feet clean and dry.  We discussed drying feet thoroughly after shower particularly between the toes.    Patient instructed to spray all shoes with Lysol disinfectant spray and let dry before wearing. Patient instructed to wash all socks in hot water and bleach.  We discussed using Lamisil for tinea pedis. This drug offers a fairly high but not universal cure rate. A 2-4 week treatment course is recommended. The patient is aware that rare cases of liver injury have been reported; and agrees to have a liver function tests performed. The symptoms of liver disease have been discussed; call if such occurs. Other side effects, such as headaches and rashes, have also been discussed.  Patient to continue topical urea cream.  Nail clippings obtained for fungal nail culture.   Discussed with patient that there may be out of pocket cost of foot gel/cream/soak pending insurance, and that they may refuse medication if cost is an issue. Patient states they understand.  Patient to return in 6 weeks or sooner if needed.               Angelia Ndiaye DPM  [facility]

## 2022-06-07 ENCOUNTER — OFFICE VISIT (OUTPATIENT)
Dept: PODIATRY | Facility: CLINIC | Age: 72
End: 2022-06-07
Payer: MEDICARE

## 2022-06-07 VITALS — WEIGHT: 190 LBS | HEIGHT: 66 IN | BODY MASS INDEX: 30.53 KG/M2

## 2022-06-07 DIAGNOSIS — R23.4 FISSURE IN SKIN OF BOTH FEET: ICD-10-CM

## 2022-06-07 DIAGNOSIS — B35.3 TINEA PEDIS OF BOTH FEET: Primary | ICD-10-CM

## 2022-06-07 PROCEDURE — 3008F PR BODY MASS INDEX (BMI) DOCUMENTED: ICD-10-PCS | Mod: CPTII,S$GLB,, | Performed by: PODIATRIST

## 2022-06-07 PROCEDURE — 1101F PT FALLS ASSESS-DOCD LE1/YR: CPT | Mod: CPTII,S$GLB,, | Performed by: PODIATRIST

## 2022-06-07 PROCEDURE — 3288F PR FALLS RISK ASSESSMENT DOCUMENTED: ICD-10-PCS | Mod: CPTII,S$GLB,, | Performed by: PODIATRIST

## 2022-06-07 PROCEDURE — 1159F PR MEDICATION LIST DOCUMENTED IN MEDICAL RECORD: ICD-10-PCS | Mod: CPTII,S$GLB,, | Performed by: PODIATRIST

## 2022-06-07 PROCEDURE — 99999 PR PBB SHADOW E&M-EST. PATIENT-LVL III: ICD-10-PCS | Mod: PBBFAC,,, | Performed by: PODIATRIST

## 2022-06-07 PROCEDURE — 99212 OFFICE O/P EST SF 10 MIN: CPT | Mod: S$GLB,,, | Performed by: PODIATRIST

## 2022-06-07 PROCEDURE — 1159F MED LIST DOCD IN RCRD: CPT | Mod: CPTII,S$GLB,, | Performed by: PODIATRIST

## 2022-06-07 PROCEDURE — 1101F PR PT FALLS ASSESS DOC 0-1 FALLS W/OUT INJ PAST YR: ICD-10-PCS | Mod: CPTII,S$GLB,, | Performed by: PODIATRIST

## 2022-06-07 PROCEDURE — 3008F BODY MASS INDEX DOCD: CPT | Mod: CPTII,S$GLB,, | Performed by: PODIATRIST

## 2022-06-07 PROCEDURE — 99212 PR OFFICE/OUTPT VISIT, EST, LEVL II, 10-19 MIN: ICD-10-PCS | Mod: S$GLB,,, | Performed by: PODIATRIST

## 2022-06-07 PROCEDURE — 1160F RVW MEDS BY RX/DR IN RCRD: CPT | Mod: CPTII,S$GLB,, | Performed by: PODIATRIST

## 2022-06-07 PROCEDURE — 99999 PR PBB SHADOW E&M-EST. PATIENT-LVL III: CPT | Mod: PBBFAC,,, | Performed by: PODIATRIST

## 2022-06-07 PROCEDURE — 1160F PR REVIEW ALL MEDS BY PRESCRIBER/CLIN PHARMACIST DOCUMENTED: ICD-10-PCS | Mod: CPTII,S$GLB,, | Performed by: PODIATRIST

## 2022-06-07 PROCEDURE — 3288F FALL RISK ASSESSMENT DOCD: CPT | Mod: CPTII,S$GLB,, | Performed by: PODIATRIST

## 2022-06-08 LAB — FUNGUS BLD CULT: NORMAL

## 2022-06-09 ENCOUNTER — PATIENT MESSAGE (OUTPATIENT)
Dept: PODIATRY | Facility: CLINIC | Age: 72
End: 2022-06-09
Payer: MEDICARE

## 2022-06-20 NOTE — PROGRESS NOTES
"Subjective:     Patient ID: Basia RENEE is a 72 y.o. female.    Chief Complaint: Tinea Pedis (Tinea pedis f/u, wears tennis shoes with socks, non-diabetic Pt, PCP Dr. Cabello, date last seen unknown)    HPI: This 72 year old female presents today for follow up of fungus. Patient states she has been applying the topical compound creams as instructed with more relief for the itching but still has the cracks in heels. Patient states she is using cream from her dermatologist. Patient denies other complaints at this time.    Patient Active Problem List   Diagnosis    GERD (gastroesophageal reflux disease)    Hypertension    Cataract       Medication List with Changes/Refills   Current Medications    AZELASTINE (ASTELIN) 137 MCG (0.1 %) NASAL SPRAY    1 spray by Nasal route.    CETIRIZINE (ZYRTEC) 10 MG TABLET    Take 10 mg by mouth.    FLUTICASONE PROPIONATE (FLONASE) 50 MCG/ACTUATION NASAL SPRAY    Take 50 mcg by mouth.    MELOXICAM (MOBIC) 15 MG TABLET    TAKE 1 TABLET BY MOUTH EVERY DAY    MOEXIPRIL-HYDROCHLOROTHIAZIDE (UNIRETIC) 7.5-12.5 MG PER TABLET    12.5 mg.    SIMVASTATIN (ZOCOR) 5 MG TABLET    Take 5 mg by mouth.    TERBINAFINE HCL (LAMISIL) 250 MG TABLET    Take 1 tablet (250 mg total) by mouth once daily.       Review of patient's allergies indicates:  No Known Allergies    History reviewed. No pertinent surgical history.    Family History   Problem Relation Age of Onset    Hypertension Mother     Hypertension Father        Social History     Socioeconomic History    Marital status:    Tobacco Use    Smoking status: Never Smoker    Smokeless tobacco: Never Used   Substance and Sexual Activity    Alcohol use: Not Currently    Drug use: Never       Vitals:    06/07/22 1342   Weight: 86.2 kg (190 lb)   Height: 5' 6" (1.676 m)         Review of Systems   Constitutional: Negative for chills and fever.   Cardiovascular: Negative.    Gastrointestinal: Negative for diarrhea, nausea and vomiting. "   Skin: Positive for rash.   Neurological: Negative.    Endo/Heme/Allergies: Negative.    Psychiatric/Behavioral: Negative.          Objective:      PHYSICAL EXAM: Apperance: Alert and orient in no distress,well developed, and with good attention to grooming and body habits  Patient presents ambulating in tennis shoes.   Lower Extremity Exam  VASCULAR: Dorsalis pedis pulses 2/4 bilateral and Posterior Tibial pulses 2/4 bilateral. Capillary fill time <4 seconds bilateral. No edema observed bilateral. Skin temperature of the lower extremities is warm to warm, proximal to distal. Hair growth WNL bilateral.  DERMATOLOGICAL: No skin rash, subcutaneous nodules, lesions or ulcers observed. Decreased dry and scaly skin noted plantarly bilateral in moccasin distribution. Decreased dry skin fissures noted to bilateral heels. Webspaces 1,2,3 bilateral are clean, dry and without evidence of break in skin integrity. Webspaces 4 bilateral minimal maceration noted. Nails thickened, and discolored with subungual debris.   NEUROLOGICAL: Light touch, sharp-dull, proprioception all present and equal bilaterally.    MUSCULOSKELETAL: Muscle strength is 5/5 for foot inverters, everters, plantarflexors, and dorsiflexors. Muscle tone is normal.         Assessment:       Encounter Diagnoses   Name Primary?    Tinea pedis of both feet Yes    Fissure in skin of both feet          Plan:   Tinea pedis of both feet    Fissure in skin of both feet      I counseled the patient on her conditions, regarding findings of my examination, my impressions, and usual treatment plan.   Discuss treatment options for tines pedis.  I explained that fungus lives in a warm dark moist environment and therefore patient should make every attempt to keep feet clean and dry.  We discussed drying feet thoroughly after shower particularly between the toes.   Patient instructed to spray all shoes with Lysol disinfectant spray and let dry before wearing. Patient  instructed to wash all socks in hot water and bleach.  We discussed using Lamisil for tinea pedis. This drug offers a fairly high but not universal cure rate. A 2-4 week treatment course is recommended. The patient is aware that rare cases of liver injury have been reported; and agrees to have a liver function tests performed. The symptoms of liver disease have been discussed; call if such occurs. Other side effects, such as headaches and rashes, have also been discussed.  Patient to continue topical urea cream.  Discussed with patient that there may be out of pocket cost of foot gel/cream/soak pending insurance, and that they may refuse medication if cost is an issue. Patient states they understand.  Patient to return in 6 weeks or sooner if needed.               Angelia Ndiaye DPM  [facility]

## 2022-07-19 ENCOUNTER — OFFICE VISIT (OUTPATIENT)
Dept: PODIATRY | Facility: CLINIC | Age: 72
End: 2022-07-19
Payer: MEDICARE

## 2022-07-19 VITALS — BODY MASS INDEX: 30.54 KG/M2 | HEIGHT: 66 IN | WEIGHT: 190.06 LBS

## 2022-07-19 DIAGNOSIS — R23.4 FISSURE IN SKIN OF BOTH FEET: ICD-10-CM

## 2022-07-19 DIAGNOSIS — B35.3 TINEA PEDIS OF BOTH FEET: Primary | ICD-10-CM

## 2022-07-19 PROCEDURE — 1160F RVW MEDS BY RX/DR IN RCRD: CPT | Mod: CPTII,S$GLB,, | Performed by: PODIATRIST

## 2022-07-19 PROCEDURE — 1101F PR PT FALLS ASSESS DOC 0-1 FALLS W/OUT INJ PAST YR: ICD-10-PCS | Mod: CPTII,S$GLB,, | Performed by: PODIATRIST

## 2022-07-19 PROCEDURE — 99999 PR PBB SHADOW E&M-EST. PATIENT-LVL III: ICD-10-PCS | Mod: PBBFAC,,, | Performed by: PODIATRIST

## 2022-07-19 PROCEDURE — 3008F BODY MASS INDEX DOCD: CPT | Mod: CPTII,S$GLB,, | Performed by: PODIATRIST

## 2022-07-19 PROCEDURE — 1126F PR PAIN SEVERITY QUANTIFIED, NO PAIN PRESENT: ICD-10-PCS | Mod: CPTII,S$GLB,, | Performed by: PODIATRIST

## 2022-07-19 PROCEDURE — 1159F MED LIST DOCD IN RCRD: CPT | Mod: CPTII,S$GLB,, | Performed by: PODIATRIST

## 2022-07-19 PROCEDURE — 3288F PR FALLS RISK ASSESSMENT DOCUMENTED: ICD-10-PCS | Mod: CPTII,S$GLB,, | Performed by: PODIATRIST

## 2022-07-19 PROCEDURE — 99212 PR OFFICE/OUTPT VISIT, EST, LEVL II, 10-19 MIN: ICD-10-PCS | Mod: S$GLB,,, | Performed by: PODIATRIST

## 2022-07-19 PROCEDURE — 99999 PR PBB SHADOW E&M-EST. PATIENT-LVL III: CPT | Mod: PBBFAC,,, | Performed by: PODIATRIST

## 2022-07-19 PROCEDURE — 3008F PR BODY MASS INDEX (BMI) DOCUMENTED: ICD-10-PCS | Mod: CPTII,S$GLB,, | Performed by: PODIATRIST

## 2022-07-19 PROCEDURE — 3288F FALL RISK ASSESSMENT DOCD: CPT | Mod: CPTII,S$GLB,, | Performed by: PODIATRIST

## 2022-07-19 PROCEDURE — 1126F AMNT PAIN NOTED NONE PRSNT: CPT | Mod: CPTII,S$GLB,, | Performed by: PODIATRIST

## 2022-07-19 PROCEDURE — 1160F PR REVIEW ALL MEDS BY PRESCRIBER/CLIN PHARMACIST DOCUMENTED: ICD-10-PCS | Mod: CPTII,S$GLB,, | Performed by: PODIATRIST

## 2022-07-19 PROCEDURE — 1159F PR MEDICATION LIST DOCUMENTED IN MEDICAL RECORD: ICD-10-PCS | Mod: CPTII,S$GLB,, | Performed by: PODIATRIST

## 2022-07-19 PROCEDURE — 99212 OFFICE O/P EST SF 10 MIN: CPT | Mod: S$GLB,,, | Performed by: PODIATRIST

## 2022-07-19 PROCEDURE — 1101F PT FALLS ASSESS-DOCD LE1/YR: CPT | Mod: CPTII,S$GLB,, | Performed by: PODIATRIST

## 2022-07-19 NOTE — PROGRESS NOTES
"Subjective:     Patient ID: Basia RENEE is a 72 y.o. female.    Chief Complaint: Follow-up (Pt c/o BL tinea pedis, pt c/op 0/10 pain, PCP Dr. Cabello last seen  6/2022)    HPI: This 72 year old female presents today for follow up of fungus. Patient states she has been applying the topical compound creams as instructed with more relief for the itching but still has the cracks in heels. Patient states she is using cream from her dermatologist. Patient denies other complaints at this time.    Patient Active Problem List   Diagnosis    GERD (gastroesophageal reflux disease)    Hypertension    Cataract       Medication List with Changes/Refills   Current Medications    AZELASTINE (ASTELIN) 137 MCG (0.1 %) NASAL SPRAY    1 spray by Nasal route.    CETIRIZINE (ZYRTEC) 10 MG TABLET    Take 10 mg by mouth.    FLUTICASONE PROPIONATE (FLONASE) 50 MCG/ACTUATION NASAL SPRAY    Take 50 mcg by mouth.    MELOXICAM (MOBIC) 15 MG TABLET    TAKE 1 TABLET BY MOUTH EVERY DAY    MOEXIPRIL-HYDROCHLOROTHIAZIDE (UNIRETIC) 7.5-12.5 MG PER TABLET    12.5 mg.    SIMVASTATIN (ZOCOR) 5 MG TABLET    Take 5 mg by mouth.    TERBINAFINE HCL (LAMISIL) 250 MG TABLET    Take 1 tablet (250 mg total) by mouth once daily.       Review of patient's allergies indicates:  No Known Allergies    History reviewed. No pertinent surgical history.    Family History   Problem Relation Age of Onset    Hypertension Mother     Hypertension Father        Social History     Socioeconomic History    Marital status:    Tobacco Use    Smoking status: Never Smoker    Smokeless tobacco: Never Used   Substance and Sexual Activity    Alcohol use: Not Currently    Drug use: Never       Vitals:    07/19/22 1507   Weight: 86.2 kg (190 lb 0.6 oz)   Height: 5' 6" (1.676 m)   PainSc: 0-No pain         Review of Systems   Constitutional: Negative for chills and fever.   Cardiovascular: Negative.    Gastrointestinal: Negative for diarrhea, nausea and vomiting. "   Skin: Positive for rash.   Neurological: Negative.    Endo/Heme/Allergies: Negative.    Psychiatric/Behavioral: Negative.          Objective:      PHYSICAL EXAM: Apperance: Alert and orient in no distress,well developed, and with good attention to grooming and body habits  Patient presents ambulating in tennis shoes.   Lower Extremity Exam  VASCULAR: Dorsalis pedis pulses 2/4 bilateral and Posterior Tibial pulses 2/4 bilateral. Capillary fill time <4 seconds bilateral. No edema observed bilateral. Skin temperature of the lower extremities is warm to warm, proximal to distal. Hair growth WNL bilateral.  DERMATOLOGICAL: No skin rash, subcutaneous nodules, lesions or ulcers observed. Decreased dry and scaly skin noted plantarly bilateral in moccasin distribution. Decreased dry skin fissures noted to bilateral heels. Webspaces 1,2,3 bilateral are clean, dry and without evidence of break in skin integrity. Webspaces 4 bilateral minimal maceration noted. Nails thickened, and discolored with subungual debris.   NEUROLOGICAL: Light touch, sharp-dull, proprioception all present and equal bilaterally.    MUSCULOSKELETAL: Muscle strength is 5/5 for foot inverters, everters, plantarflexors, and dorsiflexors. Muscle tone is normal.         Assessment:       Encounter Diagnoses   Name Primary?    Tinea pedis of both feet Yes    Fissure in skin of both feet          Plan:   Tinea pedis of both feet    Fissure in skin of both feet      I counseled the patient on her conditions, regarding findings of my examination, my impressions, and usual treatment plan.   Discuss treatment options for tines pedis.  I explained that fungus lives in a warm dark moist environment and therefore patient should make every attempt to keep feet clean and dry.  We discussed drying feet thoroughly after shower particularly between the toes.   Patient instructed to spray all shoes with Lysol disinfectant spray and let dry before wearing. Patient  instructed to wash all socks in hot water and bleach.  We discussed using Lamisil for tinea pedis. This drug offers a fairly high but not universal cure rate. A 2-4 week treatment course is recommended. The patient is aware that rare cases of liver injury have been reported; and agrees to have a liver function tests performed. The symptoms of liver disease have been discussed; call if such occurs. Other side effects, such as headaches and rashes, have also been discussed.  Patient to continue topical urea cream.  Discussed with patient that there may be out of pocket cost of foot gel/cream/soak pending insurance, and that they may refuse medication if cost is an issue. Patient states they understand.  Patient to return in 2 months or sooner if needed.               Angelia Ndiaye DPM  [facility]

## 2022-09-08 ENCOUNTER — PATIENT MESSAGE (OUTPATIENT)
Dept: PODIATRY | Facility: CLINIC | Age: 72
End: 2022-09-08
Payer: MEDICARE

## 2022-09-22 ENCOUNTER — OFFICE VISIT (OUTPATIENT)
Dept: PODIATRY | Facility: CLINIC | Age: 72
End: 2022-09-22
Payer: MEDICARE

## 2022-09-22 VITALS — BODY MASS INDEX: 30.54 KG/M2 | WEIGHT: 190.06 LBS | HEIGHT: 66 IN

## 2022-09-22 DIAGNOSIS — B35.3 TINEA PEDIS OF BOTH FEET: ICD-10-CM

## 2022-09-22 DIAGNOSIS — R23.4 FISSURE IN SKIN OF BOTH FEET: Primary | ICD-10-CM

## 2022-09-22 PROCEDURE — 1160F PR REVIEW ALL MEDS BY PRESCRIBER/CLIN PHARMACIST DOCUMENTED: ICD-10-PCS | Mod: CPTII,S$GLB,, | Performed by: PODIATRIST

## 2022-09-22 PROCEDURE — 1159F PR MEDICATION LIST DOCUMENTED IN MEDICAL RECORD: ICD-10-PCS | Mod: CPTII,S$GLB,, | Performed by: PODIATRIST

## 2022-09-22 PROCEDURE — 99212 OFFICE O/P EST SF 10 MIN: CPT | Mod: S$GLB,,, | Performed by: PODIATRIST

## 2022-09-22 PROCEDURE — 3008F BODY MASS INDEX DOCD: CPT | Mod: CPTII,S$GLB,, | Performed by: PODIATRIST

## 2022-09-22 PROCEDURE — 1101F PR PT FALLS ASSESS DOC 0-1 FALLS W/OUT INJ PAST YR: ICD-10-PCS | Mod: CPTII,S$GLB,, | Performed by: PODIATRIST

## 2022-09-22 PROCEDURE — 99999 PR PBB SHADOW E&M-EST. PATIENT-LVL III: CPT | Mod: PBBFAC,,, | Performed by: PODIATRIST

## 2022-09-22 PROCEDURE — 3288F PR FALLS RISK ASSESSMENT DOCUMENTED: ICD-10-PCS | Mod: CPTII,S$GLB,, | Performed by: PODIATRIST

## 2022-09-22 PROCEDURE — 99212 PR OFFICE/OUTPT VISIT, EST, LEVL II, 10-19 MIN: ICD-10-PCS | Mod: S$GLB,,, | Performed by: PODIATRIST

## 2022-09-22 PROCEDURE — 3008F PR BODY MASS INDEX (BMI) DOCUMENTED: ICD-10-PCS | Mod: CPTII,S$GLB,, | Performed by: PODIATRIST

## 2022-09-22 PROCEDURE — 1101F PT FALLS ASSESS-DOCD LE1/YR: CPT | Mod: CPTII,S$GLB,, | Performed by: PODIATRIST

## 2022-09-22 PROCEDURE — 3288F FALL RISK ASSESSMENT DOCD: CPT | Mod: CPTII,S$GLB,, | Performed by: PODIATRIST

## 2022-09-22 PROCEDURE — 1159F MED LIST DOCD IN RCRD: CPT | Mod: CPTII,S$GLB,, | Performed by: PODIATRIST

## 2022-09-22 PROCEDURE — 99999 PR PBB SHADOW E&M-EST. PATIENT-LVL III: ICD-10-PCS | Mod: PBBFAC,,, | Performed by: PODIATRIST

## 2022-09-22 PROCEDURE — 1160F RVW MEDS BY RX/DR IN RCRD: CPT | Mod: CPTII,S$GLB,, | Performed by: PODIATRIST

## 2022-09-22 RX ORDER — ACETAMINOPHEN 500 MG
TABLET ORAL
COMMUNITY

## 2022-09-22 RX ORDER — HYDROCHLOROTHIAZIDE 12.5 MG/1
12.5 CAPSULE ORAL
COMMUNITY
End: 2023-09-11

## 2022-09-22 RX ORDER — AMLODIPINE BESYLATE 5 MG/1
5 TABLET ORAL
COMMUNITY
Start: 2022-08-25 | End: 2023-09-11

## 2022-09-22 NOTE — PROGRESS NOTES
Subjective:     Patient ID: Basia RENEE is a 72 y.o. female.    Chief Complaint: Follow-up (BL Tinea f/u, non-diabetic pt wears tennis shoes , PCP Dr. Cabello last seen 8-25-22)    HPI: This 72 year old female presents today for follow up of fungus. Patient states she has been applying the topical compound creams as instructed with more relief for the itching but still has the cracks in heels. Patient states she is using cream from her dermatologist. Patient denies other complaints at this time.    Patient Active Problem List   Diagnosis    GERD (gastroesophageal reflux disease)    Hypertension    Cataract       Medication List with Changes/Refills   Current Medications    AMLODIPINE (NORVASC) 5 MG TABLET    Take 5 mg by mouth.    AZELASTINE (ASTELIN) 137 MCG (0.1 %) NASAL SPRAY    1 spray by Nasal route.    CETIRIZINE (ZYRTEC) 10 MG TABLET    Take 10 mg by mouth.    CHOLECALCIFEROL, VITAMIN D3, (VITAMIN D3) 50 MCG (2,000 UNIT) CAP CAPSULE    Take by mouth.    FLUTICASONE PROPIONATE (FLONASE) 50 MCG/ACTUATION NASAL SPRAY    Take 50 mcg by mouth.    HYDROCHLOROTHIAZIDE (MICROZIDE) 12.5 MG CAPSULE    Take 12.5 mg by mouth.    MELOXICAM (MOBIC) 15 MG TABLET    TAKE 1 TABLET BY MOUTH EVERY DAY    MOEXIPRIL-HYDROCHLOROTHIAZIDE (UNIRETIC) 7.5-12.5 MG PER TABLET    12.5 mg.    OMEGA-3S-DHA-EPA-FISH  MG-300 MG- 100 MG-1,000 MG CAP    Take by mouth.    SIMVASTATIN (ZOCOR) 5 MG TABLET    Take 5 mg by mouth.    TERBINAFINE HCL (LAMISIL) 250 MG TABLET    Take 1 tablet (250 mg total) by mouth once daily.       Review of patient's allergies indicates:   Allergen Reactions    Tree and shrub pollen Other (See Comments)     .       History reviewed. No pertinent surgical history.    Family History   Problem Relation Age of Onset    Hypertension Mother     Hypertension Father        Social History     Socioeconomic History    Marital status:    Tobacco Use    Smoking status: Never    Smokeless tobacco: Never  "  Substance and Sexual Activity    Alcohol use: Not Currently    Drug use: Never       Vitals:    09/22/22 1154   Weight: 86.2 kg (190 lb 0.6 oz)   Height: 5' 6" (1.676 m)         Review of Systems   Constitutional: Negative for chills and fever.   Cardiovascular: Negative.    Gastrointestinal: Negative for diarrhea, nausea and vomiting.   Skin: Positive for rash.   Neurological: Negative.    Endo/Heme/Allergies: Negative.    Psychiatric/Behavioral: Negative.          Objective:      PHYSICAL EXAM: Apperance: Alert and orient in no distress,well developed, and with good attention to grooming and body habits  Patient presents ambulating in tennis shoes.   Lower Extremity Exam  VASCULAR: Dorsalis pedis pulses 2/4 bilateral and Posterior Tibial pulses 2/4 bilateral. Capillary fill time <4 seconds bilateral. No edema observed bilateral. Skin temperature of the lower extremities is warm to warm, proximal to distal. Hair growth WNL bilateral.  DERMATOLOGICAL: No skin rash, subcutaneous nodules, lesions or ulcers observed. Decreased dry and scaly skin noted plantarly bilateral in moccasin distribution. Decreased dry skin fissures noted to bilateral heels. Webspaces 1,2,3 bilateral are clean, dry and without evidence of break in skin integrity. Webspaces 4 bilateral minimal maceration noted. Nails thickened, and discolored with subungual debris.   NEUROLOGICAL: Light touch, sharp-dull, proprioception all present and equal bilaterally.    MUSCULOSKELETAL: Muscle strength is 5/5 for foot inverters, everters, plantarflexors, and dorsiflexors. Muscle tone is normal.         Assessment:       Encounter Diagnoses   Name Primary?    Fissure in skin of both feet Yes    Tinea pedis of both feet          Plan:   Fissure in skin of both feet    Tinea pedis of both feet    I counseled the patient on her conditions, regarding findings of my examination, my impressions, and usual treatment plan.   Discuss treatment options for tines " pedis.  I explained that fungus lives in a warm dark moist environment and therefore patient should make every attempt to keep feet clean and dry.  We discussed drying feet thoroughly after shower particularly between the toes.   Patient instructed to spray all shoes with Lysol disinfectant spray and let dry before wearing. Patient instructed to wash all socks in hot water and bleach.  We discussed using Lamisil for tinea pedis. This drug offers a fairly high but not universal cure rate. A 2-4 week treatment course is recommended. The patient is aware that rare cases of liver injury have been reported; and agrees to have a liver function tests performed. The symptoms of liver disease have been discussed; call if such occurs. Other side effects, such as headaches and rashes, have also been discussed.  Patient to continue topical urea cream.  Discussed with patient that there may be out of pocket cost of foot gel/cream/soak pending insurance, and that they may refuse medication if cost is an issue. Patient states they understand.  Patient to return in 2 months or sooner if needed.             Angelia Ndiaye DPM  [facility]

## 2022-12-06 ENCOUNTER — OFFICE VISIT (OUTPATIENT)
Dept: PODIATRY | Facility: CLINIC | Age: 72
End: 2022-12-06
Payer: MEDICARE

## 2022-12-06 VITALS — BODY MASS INDEX: 30.53 KG/M2 | WEIGHT: 190 LBS | HEIGHT: 66 IN

## 2022-12-06 DIAGNOSIS — R23.4 FISSURE IN SKIN OF BOTH FEET: Primary | ICD-10-CM

## 2022-12-06 DIAGNOSIS — M79.674 GREAT TOE PAIN, RIGHT: ICD-10-CM

## 2022-12-06 DIAGNOSIS — B35.3 TINEA PEDIS OF BOTH FEET: ICD-10-CM

## 2022-12-06 PROCEDURE — 3288F PR FALLS RISK ASSESSMENT DOCUMENTED: ICD-10-PCS | Mod: CPTII,S$GLB,, | Performed by: PODIATRIST

## 2022-12-06 PROCEDURE — 99999 PR PBB SHADOW E&M-EST. PATIENT-LVL III: CPT | Mod: PBBFAC,,, | Performed by: PODIATRIST

## 2022-12-06 PROCEDURE — 99999 PR PBB SHADOW E&M-EST. PATIENT-LVL III: ICD-10-PCS | Mod: PBBFAC,,, | Performed by: PODIATRIST

## 2022-12-06 PROCEDURE — 1126F PR PAIN SEVERITY QUANTIFIED, NO PAIN PRESENT: ICD-10-PCS | Mod: CPTII,S$GLB,, | Performed by: PODIATRIST

## 2022-12-06 PROCEDURE — 99212 PR OFFICE/OUTPT VISIT, EST, LEVL II, 10-19 MIN: ICD-10-PCS | Mod: S$GLB,,, | Performed by: PODIATRIST

## 2022-12-06 PROCEDURE — 1160F RVW MEDS BY RX/DR IN RCRD: CPT | Mod: CPTII,S$GLB,, | Performed by: PODIATRIST

## 2022-12-06 PROCEDURE — 1159F PR MEDICATION LIST DOCUMENTED IN MEDICAL RECORD: ICD-10-PCS | Mod: CPTII,S$GLB,, | Performed by: PODIATRIST

## 2022-12-06 PROCEDURE — 1126F AMNT PAIN NOTED NONE PRSNT: CPT | Mod: CPTII,S$GLB,, | Performed by: PODIATRIST

## 2022-12-06 PROCEDURE — 1159F MED LIST DOCD IN RCRD: CPT | Mod: CPTII,S$GLB,, | Performed by: PODIATRIST

## 2022-12-06 PROCEDURE — 3008F PR BODY MASS INDEX (BMI) DOCUMENTED: ICD-10-PCS | Mod: CPTII,S$GLB,, | Performed by: PODIATRIST

## 2022-12-06 PROCEDURE — 99212 OFFICE O/P EST SF 10 MIN: CPT | Mod: S$GLB,,, | Performed by: PODIATRIST

## 2022-12-06 PROCEDURE — 1160F PR REVIEW ALL MEDS BY PRESCRIBER/CLIN PHARMACIST DOCUMENTED: ICD-10-PCS | Mod: CPTII,S$GLB,, | Performed by: PODIATRIST

## 2022-12-06 PROCEDURE — 3288F FALL RISK ASSESSMENT DOCD: CPT | Mod: CPTII,S$GLB,, | Performed by: PODIATRIST

## 2022-12-06 PROCEDURE — 1101F PT FALLS ASSESS-DOCD LE1/YR: CPT | Mod: CPTII,S$GLB,, | Performed by: PODIATRIST

## 2022-12-06 PROCEDURE — 1101F PR PT FALLS ASSESS DOC 0-1 FALLS W/OUT INJ PAST YR: ICD-10-PCS | Mod: CPTII,S$GLB,, | Performed by: PODIATRIST

## 2022-12-06 PROCEDURE — 3008F BODY MASS INDEX DOCD: CPT | Mod: CPTII,S$GLB,, | Performed by: PODIATRIST

## 2022-12-06 NOTE — PROGRESS NOTES
Subjective:     Patient ID: Basia RENEE is a 72 y.o. female.    Chief Complaint: Tinea Pedis (Soreness to right hallux, Non-diabetic Pt, wears tennis shoes with socks, Last seen on 10/06/22 with PCP Dr. Jefferson)    HPI: This 72 year old female presents today for follow up of fungus. Patient states she has been applying the topical compound creams as instructed with more relief for the itching but still has the cracks in heels. Patient states she has pain to right hallux. Patient points to right dorsal/tip hallux. Patient denies any injury to toe. Patient denies other complaints at this time.    Patient Active Problem List   Diagnosis    GERD (gastroesophageal reflux disease)    Hypertension    Cataract       Medication List with Changes/Refills   Current Medications    AMLODIPINE (NORVASC) 5 MG TABLET    Take 5 mg by mouth.    AZELASTINE (ASTELIN) 137 MCG (0.1 %) NASAL SPRAY    1 spray by Nasal route.    CETIRIZINE (ZYRTEC) 10 MG TABLET    Take 10 mg by mouth.    CHOLECALCIFEROL, VITAMIN D3, (VITAMIN D3) 50 MCG (2,000 UNIT) CAP CAPSULE    Take by mouth.    FLUTICASONE PROPIONATE (FLONASE) 50 MCG/ACTUATION NASAL SPRAY    Take 50 mcg by mouth.    HYDROCHLOROTHIAZIDE (MICROZIDE) 12.5 MG CAPSULE    Take 12.5 mg by mouth.    MELOXICAM (MOBIC) 15 MG TABLET    TAKE 1 TABLET BY MOUTH EVERY DAY    MOEXIPRIL-HYDROCHLOROTHIAZIDE (UNIRETIC) 7.5-12.5 MG PER TABLET    12.5 mg.    OMEGA-3S-DHA-EPA-FISH  MG-300 MG- 100 MG-1,000 MG CAP    Take by mouth.    SIMVASTATIN (ZOCOR) 5 MG TABLET    Take 5 mg by mouth.    TERBINAFINE HCL (LAMISIL) 250 MG TABLET    Take 1 tablet (250 mg total) by mouth once daily.       Review of patient's allergies indicates:   Allergen Reactions    Tree and shrub pollen Other (See Comments)     .       History reviewed. No pertinent surgical history.    Family History   Problem Relation Age of Onset    Hypertension Mother     Hypertension Father        Social History     Socioeconomic History     "Marital status:    Tobacco Use    Smoking status: Never    Smokeless tobacco: Never   Substance and Sexual Activity    Alcohol use: Not Currently    Drug use: Never       Vitals:    12/06/22 0901   Weight: 86.2 kg (190 lb)   Height: 5' 6" (1.676 m)   PainSc: 0-No pain   PainLoc: Foot         Review of Systems   Constitutional:  Negative for chills and fever.   Cardiovascular: Negative.    Gastrointestinal:  Negative for diarrhea, nausea and vomiting.   Skin:  Positive for rash.   Neurological: Negative.    Endo/Heme/Allergies: Negative.    Psychiatric/Behavioral: Negative.         Objective:      PHYSICAL EXAM: Apperance: Alert and orient in no distress,well developed, and with good attention to grooming and body habits  Patient presents ambulating in tennis shoes.   Lower Extremity Exam  VASCULAR: Dorsalis pedis pulses 2/4 bilateral and Posterior Tibial pulses 2/4 bilateral. Capillary fill time <4 seconds bilateral. No edema observed bilateral. Skin temperature of the lower extremities is warm to warm, proximal to distal. Hair growth WNL bilateral.  DERMATOLOGICAL: No skin rash, subcutaneous nodules, lesions or ulcers observed. Decreased dry and scaly skin noted plantarly bilateral in moccasin distribution. Decreased dry skin fissures noted to bilateral heels. Webspaces 1,2,3 bilateral are clean, dry and without evidence of break in skin integrity. Webspaces 4 bilateral minimal maceration noted. Nails thickened, and discolored with subungual debris.   NEUROLOGICAL: Light touch, sharp-dull, proprioception all present and equal bilaterally.    MUSCULOSKELETAL: Muscle strength is 5/5 for foot inverters, everters, plantarflexors, and dorsiflexors. Muscle tone is normal.         Assessment:       Encounter Diagnoses   Name Primary?    Fissure in skin of both feet Yes    Tinea pedis of both feet     Great toe pain, right            Plan:   Fissure in skin of both feet    Tinea pedis of both feet    Great toe pain, " right      I counseled the patient on her conditions, regarding findings of my examination, my impressions, and usual treatment plan.   Discuss treatment options for tines pedis.  I explained that fungus lives in a warm dark moist environment and therefore patient should make every attempt to keep feet clean and dry.  We discussed drying feet thoroughly after shower particularly between the toes.   Patient instructed to spray all shoes with Lysol disinfectant spray and let dry before wearing. Patient instructed to wash all socks in hot water and bleach.  We discussed using Lamisil for tinea pedis. This drug offers a fairly high but not universal cure rate. A 2-4 week treatment course is recommended. The patient is aware that rare cases of liver injury have been reported; and agrees to have a liver function tests performed. The symptoms of liver disease have been discussed; call if such occurs. Other side effects, such as headaches and rashes, have also been discussed.  Patient to continue topical urea cream.  Referral completed for Dermatology.   Discussed with patient that there may be out of pocket cost of foot gel/cream/soak pending insurance, and that they may refuse medication if cost is an issue. Patient states they understand.  Patient to return in 2 months or sooner if needed.             Angelia Ndiaye DPM  [facility]

## 2023-01-26 ENCOUNTER — PATIENT MESSAGE (OUTPATIENT)
Dept: PODIATRY | Facility: CLINIC | Age: 73
End: 2023-01-26
Payer: MEDICARE

## 2023-01-31 ENCOUNTER — PATIENT MESSAGE (OUTPATIENT)
Dept: PODIATRY | Facility: CLINIC | Age: 73
End: 2023-01-31
Payer: MEDICARE

## 2023-02-02 ENCOUNTER — OFFICE VISIT (OUTPATIENT)
Dept: DERMATOLOGY | Facility: CLINIC | Age: 73
End: 2023-02-02
Payer: MEDICARE

## 2023-02-02 VITALS — WEIGHT: 190.06 LBS | BODY MASS INDEX: 30.54 KG/M2 | HEIGHT: 66 IN

## 2023-02-02 DIAGNOSIS — R63.5 ABNORMAL WEIGHT GAIN: ICD-10-CM

## 2023-02-02 DIAGNOSIS — L40.3 PALMOPLANTAR PUSTULAR PSORIASIS: Primary | ICD-10-CM

## 2023-02-02 PROCEDURE — 3008F BODY MASS INDEX DOCD: CPT | Mod: CPTII,S$GLB,, | Performed by: DERMATOLOGY

## 2023-02-02 PROCEDURE — 1101F PT FALLS ASSESS-DOCD LE1/YR: CPT | Mod: CPTII,S$GLB,, | Performed by: DERMATOLOGY

## 2023-02-02 PROCEDURE — 1160F RVW MEDS BY RX/DR IN RCRD: CPT | Mod: CPTII,S$GLB,, | Performed by: DERMATOLOGY

## 2023-02-02 PROCEDURE — 1126F AMNT PAIN NOTED NONE PRSNT: CPT | Mod: CPTII,S$GLB,, | Performed by: DERMATOLOGY

## 2023-02-02 PROCEDURE — 99204 OFFICE O/P NEW MOD 45 MIN: CPT | Mod: S$GLB,,, | Performed by: DERMATOLOGY

## 2023-02-02 PROCEDURE — 99999 PR PBB SHADOW E&M-EST. PATIENT-LVL III: CPT | Mod: PBBFAC,,, | Performed by: DERMATOLOGY

## 2023-02-02 PROCEDURE — 3288F FALL RISK ASSESSMENT DOCD: CPT | Mod: CPTII,S$GLB,, | Performed by: DERMATOLOGY

## 2023-02-02 PROCEDURE — 1160F PR REVIEW ALL MEDS BY PRESCRIBER/CLIN PHARMACIST DOCUMENTED: ICD-10-PCS | Mod: CPTII,S$GLB,, | Performed by: DERMATOLOGY

## 2023-02-02 PROCEDURE — 99999 PR PBB SHADOW E&M-EST. PATIENT-LVL III: ICD-10-PCS | Mod: PBBFAC,,, | Performed by: DERMATOLOGY

## 2023-02-02 PROCEDURE — 3008F PR BODY MASS INDEX (BMI) DOCUMENTED: ICD-10-PCS | Mod: CPTII,S$GLB,, | Performed by: DERMATOLOGY

## 2023-02-02 PROCEDURE — 1159F PR MEDICATION LIST DOCUMENTED IN MEDICAL RECORD: ICD-10-PCS | Mod: CPTII,S$GLB,, | Performed by: DERMATOLOGY

## 2023-02-02 PROCEDURE — 1126F PR PAIN SEVERITY QUANTIFIED, NO PAIN PRESENT: ICD-10-PCS | Mod: CPTII,S$GLB,, | Performed by: DERMATOLOGY

## 2023-02-02 PROCEDURE — 1101F PR PT FALLS ASSESS DOC 0-1 FALLS W/OUT INJ PAST YR: ICD-10-PCS | Mod: CPTII,S$GLB,, | Performed by: DERMATOLOGY

## 2023-02-02 PROCEDURE — 3288F PR FALLS RISK ASSESSMENT DOCUMENTED: ICD-10-PCS | Mod: CPTII,S$GLB,, | Performed by: DERMATOLOGY

## 2023-02-02 PROCEDURE — 1159F MED LIST DOCD IN RCRD: CPT | Mod: CPTII,S$GLB,, | Performed by: DERMATOLOGY

## 2023-02-02 PROCEDURE — 99204 PR OFFICE/OUTPT VISIT, NEW, LEVL IV, 45-59 MIN: ICD-10-PCS | Mod: S$GLB,,, | Performed by: DERMATOLOGY

## 2023-02-02 NOTE — PROGRESS NOTES
Subjective:       Patient ID:  Basia RENEE is a 72 y.o. female who presents for   Chief Complaint   Patient presents with    Dry Skin     On both ft      History of Present Illness: The patient presents with chief complaint of potential fungal infection, referred by Dr. Ndiaye and previously seen by Dr. Marylu Linton.  Location: feet  Duration: 1 year  Signs/Symptoms: scaling, thickening, pruritus    Prior treatments: calcipotriene, fluocinolone, urea (improving), vaseline        Review of Systems   Skin:  Positive for activity-related sunscreen use. Negative for daily sunscreen use and recent sunburn.   Hematologic/Lymphatic: Does not bruise/bleed easily.      Objective:    Physical Exam   Constitutional: She appears well-developed and well-nourished. No distress.   Neurological: She is alert and oriented to person, place, and time. She is not disoriented.   Psychiatric: She has a normal mood and affect.   Skin:   Areas Examined (abnormalities noted in diagram):   Head / Face Inspection Performed  Neck Inspection Performed  Chest / Axilla Inspection Performed  RUE Inspected  LUE Inspection Performed  Nails and Digits Inspection Performed                      Assessment / Plan:        Palmoplantar pustular psoriasis  -     CBC Auto Differential; Future; Expected date: 02/02/2023  -     Comprehensive Metabolic Panel; Future; Expected date: 02/02/2023  -     TON; Future; Expected date: 02/02/2023  -     HEPATITIS PANEL, ACUTE; Future; Expected date: 02/02/2023  -     HIV 1/2 Ag/Ab (4th Gen); Future; Expected date: 02/02/2023  -     Quantiferon Gold TB; Future; Expected date: 02/02/2023  -     patient has failed topical therapy with calcipotriene, fluocinolone, and urea cream.  Discussed start of biologic therapy.  Patient is amenable.  We will check baseline labs and plans to start Cosentyx.    Discussed benefits and risks of Cosentyx including but not limited to injection site reactions, increased risk of  infection, decreased tumor surveillance, and inflammatory bowel disease.  The patient acknowledged understanding and wishes to proceed with Cosentyx. Patient informed to discontinue Cosentyx and notify our office if an infection develops.    Abnormal weight gain  -     HEPATITIS PANEL, ACUTE; Future; Expected date: 02/02/2023           Follow up in about 3 months (around 5/2/2023).

## 2023-02-03 ENCOUNTER — LAB VISIT (OUTPATIENT)
Dept: LAB | Facility: HOSPITAL | Age: 73
End: 2023-02-03
Attending: DERMATOLOGY
Payer: MEDICARE

## 2023-02-03 DIAGNOSIS — R63.5 ABNORMAL WEIGHT GAIN: ICD-10-CM

## 2023-02-03 DIAGNOSIS — L40.3 PALMOPLANTAR PUSTULAR PSORIASIS: ICD-10-CM

## 2023-02-03 PROCEDURE — 86480 TB TEST CELL IMMUN MEASURE: CPT | Performed by: DERMATOLOGY

## 2023-02-03 PROCEDURE — 85025 COMPLETE CBC W/AUTO DIFF WBC: CPT | Performed by: DERMATOLOGY

## 2023-02-03 PROCEDURE — 36415 COLL VENOUS BLD VENIPUNCTURE: CPT | Mod: PO | Performed by: DERMATOLOGY

## 2023-02-03 PROCEDURE — 80053 COMPREHEN METABOLIC PANEL: CPT | Performed by: DERMATOLOGY

## 2023-02-03 PROCEDURE — 80074 ACUTE HEPATITIS PANEL: CPT | Performed by: DERMATOLOGY

## 2023-02-03 PROCEDURE — 87389 HIV-1 AG W/HIV-1&-2 AB AG IA: CPT | Performed by: DERMATOLOGY

## 2023-02-03 PROCEDURE — 86038 ANTINUCLEAR ANTIBODIES: CPT | Performed by: DERMATOLOGY

## 2023-02-04 LAB
ALBUMIN SERPL BCP-MCNC: 3.7 G/DL (ref 3.5–5.2)
ALP SERPL-CCNC: 103 U/L (ref 55–135)
ALT SERPL W/O P-5'-P-CCNC: 29 U/L (ref 10–44)
ANION GAP SERPL CALC-SCNC: 11 MMOL/L (ref 8–16)
AST SERPL-CCNC: 31 U/L (ref 10–40)
BASOPHILS # BLD AUTO: 0.07 K/UL (ref 0–0.2)
BASOPHILS NFR BLD: 0.9 % (ref 0–1.9)
BILIRUB SERPL-MCNC: 0.3 MG/DL (ref 0.1–1)
BUN SERPL-MCNC: 13 MG/DL (ref 8–23)
CALCIUM SERPL-MCNC: 9.4 MG/DL (ref 8.7–10.5)
CHLORIDE SERPL-SCNC: 105 MMOL/L (ref 95–110)
CO2 SERPL-SCNC: 26 MMOL/L (ref 23–29)
CREAT SERPL-MCNC: 1.1 MG/DL (ref 0.5–1.4)
DIFFERENTIAL METHOD: ABNORMAL
EOSINOPHIL # BLD AUTO: 0.1 K/UL (ref 0–0.5)
EOSINOPHIL NFR BLD: 1 % (ref 0–8)
ERYTHROCYTE [DISTWIDTH] IN BLOOD BY AUTOMATED COUNT: 14.7 % (ref 11.5–14.5)
EST. GFR  (NO RACE VARIABLE): 53.4 ML/MIN/1.73 M^2
GLUCOSE SERPL-MCNC: 170 MG/DL (ref 70–110)
HAV IGM SERPL QL IA: NORMAL
HBV CORE IGM SERPL QL IA: NORMAL
HBV SURFACE AG SERPL QL IA: NORMAL
HCT VFR BLD AUTO: 46 % (ref 37–48.5)
HCV AB SERPL QL IA: NORMAL
HGB BLD-MCNC: 13.7 G/DL (ref 12–16)
HIV 1+2 AB+HIV1 P24 AG SERPL QL IA: NORMAL
IMM GRANULOCYTES # BLD AUTO: 0.02 K/UL (ref 0–0.04)
IMM GRANULOCYTES NFR BLD AUTO: 0.3 % (ref 0–0.5)
LYMPHOCYTES # BLD AUTO: 2.7 K/UL (ref 1–4.8)
LYMPHOCYTES NFR BLD: 34.9 % (ref 18–48)
MCH RBC QN AUTO: 25.7 PG (ref 27–31)
MCHC RBC AUTO-ENTMCNC: 29.8 G/DL (ref 32–36)
MCV RBC AUTO: 86 FL (ref 82–98)
MONOCYTES # BLD AUTO: 0.6 K/UL (ref 0.3–1)
MONOCYTES NFR BLD: 7.6 % (ref 4–15)
NEUTROPHILS # BLD AUTO: 4.2 K/UL (ref 1.8–7.7)
NEUTROPHILS NFR BLD: 55.3 % (ref 38–73)
NRBC BLD-RTO: 0 /100 WBC
PLATELET # BLD AUTO: 223 K/UL (ref 150–450)
PMV BLD AUTO: 13 FL (ref 9.2–12.9)
POTASSIUM SERPL-SCNC: 4.2 MMOL/L (ref 3.5–5.1)
PROT SERPL-MCNC: 6.9 G/DL (ref 6–8.4)
RBC # BLD AUTO: 5.33 M/UL (ref 4–5.4)
SODIUM SERPL-SCNC: 142 MMOL/L (ref 136–145)
WBC # BLD AUTO: 7.66 K/UL (ref 3.9–12.7)

## 2023-02-06 LAB
ANA SER QL IF: NORMAL
GAMMA INTERFERON BACKGROUND BLD IA-ACNC: 0.02 IU/ML
M TB IFN-G CD4+ BCKGRND COR BLD-ACNC: 0.01 IU/ML
MITOGEN IGNF BCKGRD COR BLD-ACNC: 7.49 IU/ML
TB GOLD PLUS: NEGATIVE
TB2 - NIL: 0.01 IU/ML

## 2023-02-07 ENCOUNTER — OFFICE VISIT (OUTPATIENT)
Dept: PODIATRY | Facility: CLINIC | Age: 73
End: 2023-02-07
Payer: MEDICARE

## 2023-02-07 DIAGNOSIS — L40.3 PALMOPLANTAR PUSTULAR PSORIASIS: Primary | ICD-10-CM

## 2023-02-07 PROCEDURE — 99999 PR PBB SHADOW E&M-EST. PATIENT-LVL III: CPT | Mod: PBBFAC,,, | Performed by: PODIATRIST

## 2023-02-07 PROCEDURE — 99213 OFFICE O/P EST LOW 20 MIN: CPT | Mod: S$GLB,,, | Performed by: PODIATRIST

## 2023-02-07 PROCEDURE — 1160F PR REVIEW ALL MEDS BY PRESCRIBER/CLIN PHARMACIST DOCUMENTED: ICD-10-PCS | Mod: CPTII,S$GLB,, | Performed by: PODIATRIST

## 2023-02-07 PROCEDURE — 3288F FALL RISK ASSESSMENT DOCD: CPT | Mod: CPTII,S$GLB,, | Performed by: PODIATRIST

## 2023-02-07 PROCEDURE — 1101F PT FALLS ASSESS-DOCD LE1/YR: CPT | Mod: CPTII,S$GLB,, | Performed by: PODIATRIST

## 2023-02-07 PROCEDURE — 99999 PR PBB SHADOW E&M-EST. PATIENT-LVL III: ICD-10-PCS | Mod: PBBFAC,,, | Performed by: PODIATRIST

## 2023-02-07 PROCEDURE — 99213 PR OFFICE/OUTPT VISIT, EST, LEVL III, 20-29 MIN: ICD-10-PCS | Mod: S$GLB,,, | Performed by: PODIATRIST

## 2023-02-07 PROCEDURE — 1160F RVW MEDS BY RX/DR IN RCRD: CPT | Mod: CPTII,S$GLB,, | Performed by: PODIATRIST

## 2023-02-07 PROCEDURE — 1159F PR MEDICATION LIST DOCUMENTED IN MEDICAL RECORD: ICD-10-PCS | Mod: CPTII,S$GLB,, | Performed by: PODIATRIST

## 2023-02-07 PROCEDURE — 1101F PR PT FALLS ASSESS DOC 0-1 FALLS W/OUT INJ PAST YR: ICD-10-PCS | Mod: CPTII,S$GLB,, | Performed by: PODIATRIST

## 2023-02-07 PROCEDURE — 1159F MED LIST DOCD IN RCRD: CPT | Mod: CPTII,S$GLB,, | Performed by: PODIATRIST

## 2023-02-07 PROCEDURE — 3288F PR FALLS RISK ASSESSMENT DOCUMENTED: ICD-10-PCS | Mod: CPTII,S$GLB,, | Performed by: PODIATRIST

## 2023-02-10 ENCOUNTER — SPECIALTY PHARMACY (OUTPATIENT)
Dept: PHARMACY | Facility: CLINIC | Age: 73
End: 2023-02-10
Payer: MEDICARE

## 2023-02-10 DIAGNOSIS — L40.3 PALMOPLANTAR PUSTULAR PSORIASIS: Primary | ICD-10-CM

## 2023-02-10 DIAGNOSIS — Z79.899 ENCOUNTER FOR LONG-TERM (CURRENT) USE OF MEDICATIONS: ICD-10-CM

## 2023-02-10 RX ORDER — SECUKINUMAB 150 MG/ML
INJECTION SUBCUTANEOUS
Qty: 2 EACH | Refills: 2 | Status: SHIPPED | OUTPATIENT
Start: 2023-02-10 | End: 2023-02-17 | Stop reason: SDUPTHER

## 2023-02-10 RX ORDER — SECUKINUMAB 150 MG/ML
INJECTION SUBCUTANEOUS
Qty: 10 EACH | Refills: 0 | Status: SHIPPED | OUTPATIENT
Start: 2023-02-10 | End: 2023-02-17 | Stop reason: SDUPTHER

## 2023-02-10 NOTE — TELEPHONE ENCOUNTER
Outgoing call to get pt consent for pap, no VM. Need hhs/income and how they would like to receive application (email or mail).

## 2023-02-10 NOTE — TELEPHONE ENCOUNTER
PA approved from 02/10/2023  to 8/10/2023  Case ID: [id number]      Benefits Investigation   Insurance name: [employer] Part D   Copay: $4386.17  LIS LVL: none      Forwarding to FA.

## 2023-02-10 NOTE — TELEPHONE ENCOUNTER
Cheli, this is Elisa Rasmussen with [facility].  We are working on your prescription that your doctor has sent us. We will be working with your insurance to get this approved for you. We will be calling you along the way with updates on your medication.  If you have any questions, you can reach us at [phone].    Welcome call outcome: No answer/Unable to leave voicemail    Cosentyx rx received   PA is required.  PA submitted CMM key: [id number]

## 2023-02-14 NOTE — TELEPHONE ENCOUNTER
Second outgoing call to get pt consent for pap, pt consents and provided hhs/income. Pt would like forms sent to address on file. Application mailed, provider portion faxed.

## 2023-02-16 ENCOUNTER — PATIENT MESSAGE (OUTPATIENT)
Dept: DERMATOLOGY | Facility: CLINIC | Age: 73
End: 2023-02-16
Payer: MEDICARE

## 2023-02-17 DIAGNOSIS — L40.3 PALMOPLANTAR PUSTULAR PSORIASIS: Primary | ICD-10-CM

## 2023-02-17 DIAGNOSIS — Z79.899 ENCOUNTER FOR LONG-TERM (CURRENT) USE OF MEDICATIONS: ICD-10-CM

## 2023-02-17 RX ORDER — SECUKINUMAB 150 MG/ML
INJECTION SUBCUTANEOUS
Qty: 2 EACH | Refills: 2 | Status: SHIPPED | OUTPATIENT
Start: 2023-02-17 | End: 2023-03-01 | Stop reason: SDUPTHER

## 2023-02-17 RX ORDER — SECUKINUMAB 150 MG/ML
INJECTION SUBCUTANEOUS
Qty: 10 EACH | Refills: 0 | Status: SHIPPED | OUTPATIENT
Start: 2023-02-17 | End: 2023-03-01 | Stop reason: SDUPTHER

## 2023-02-20 NOTE — TELEPHONE ENCOUNTER
Patient incoming with question on PAP. Will send in with social security aware letter and tax return.No other question or concern.

## 2023-02-27 ENCOUNTER — PATIENT MESSAGE (OUTPATIENT)
Dept: DERMATOLOGY | Facility: CLINIC | Age: 73
End: 2023-02-27
Payer: MEDICARE

## 2023-03-01 DIAGNOSIS — Z79.899 ENCOUNTER FOR LONG-TERM (CURRENT) USE OF MEDICATIONS: ICD-10-CM

## 2023-03-01 DIAGNOSIS — L40.3 PALMOPLANTAR PUSTULAR PSORIASIS: Primary | ICD-10-CM

## 2023-03-01 RX ORDER — SECUKINUMAB 150 MG/ML
INJECTION SUBCUTANEOUS
Qty: 2 EACH | Refills: 2 | Status: SHIPPED | OUTPATIENT
Start: 2023-03-01 | End: 2024-02-12

## 2023-03-01 RX ORDER — SECUKINUMAB 150 MG/ML
INJECTION SUBCUTANEOUS
Qty: 10 EACH | Refills: 0 | Status: SHIPPED | OUTPATIENT
Start: 2023-03-01 | End: 2023-09-11 | Stop reason: SDUPTHER

## 2023-03-09 NOTE — TELEPHONE ENCOUNTER
PAP approved through 12/31/2023. Outgoing call to inform pt, pt expressed understanding    Closing out FA, routing to Alex

## 2023-03-16 ENCOUNTER — PATIENT MESSAGE (OUTPATIENT)
Dept: DERMATOLOGY | Facility: CLINIC | Age: 73
End: 2023-03-16
Payer: MEDICARE

## 2023-03-20 NOTE — TELEPHONE ENCOUNTER
Returned VM from [facility] [employer] to confirm that patient can receive shipment directly for self-administration. Attempted to contact pt to inform her to reach out to [employer] ([phone]) to schedule her shipment.

## 2023-03-23 ENCOUNTER — OFFICE VISIT (OUTPATIENT)
Dept: DERMATOLOGY | Facility: CLINIC | Age: 73
End: 2023-03-23
Payer: MEDICARE

## 2023-03-23 DIAGNOSIS — Z79.899 ENCOUNTER FOR LONG-TERM (CURRENT) USE OF MEDICATIONS: ICD-10-CM

## 2023-03-23 DIAGNOSIS — L40.3 PALMOPLANTAR PUSTULAR PSORIASIS: Primary | ICD-10-CM

## 2023-03-23 PROCEDURE — 3288F FALL RISK ASSESSMENT DOCD: CPT | Mod: CPTII,S$GLB,, | Performed by: DERMATOLOGY

## 2023-03-23 PROCEDURE — 1159F PR MEDICATION LIST DOCUMENTED IN MEDICAL RECORD: ICD-10-PCS | Mod: CPTII,S$GLB,, | Performed by: DERMATOLOGY

## 2023-03-23 PROCEDURE — 99499 NO LOS: ICD-10-PCS | Mod: S$GLB,,, | Performed by: DERMATOLOGY

## 2023-03-23 PROCEDURE — 99999 PR PBB SHADOW E&M-EST. PATIENT-LVL II: ICD-10-PCS | Mod: PBBFAC,,, | Performed by: DERMATOLOGY

## 2023-03-23 PROCEDURE — 1160F RVW MEDS BY RX/DR IN RCRD: CPT | Mod: CPTII,S$GLB,, | Performed by: DERMATOLOGY

## 2023-03-23 PROCEDURE — 1159F MED LIST DOCD IN RCRD: CPT | Mod: CPTII,S$GLB,, | Performed by: DERMATOLOGY

## 2023-03-23 PROCEDURE — 1101F PR PT FALLS ASSESS DOC 0-1 FALLS W/OUT INJ PAST YR: ICD-10-PCS | Mod: CPTII,S$GLB,, | Performed by: DERMATOLOGY

## 2023-03-23 PROCEDURE — 1101F PT FALLS ASSESS-DOCD LE1/YR: CPT | Mod: CPTII,S$GLB,, | Performed by: DERMATOLOGY

## 2023-03-23 PROCEDURE — 99499 UNLISTED E&M SERVICE: CPT | Mod: S$GLB,,, | Performed by: DERMATOLOGY

## 2023-03-23 PROCEDURE — 99999 PR PBB SHADOW E&M-EST. PATIENT-LVL II: CPT | Mod: PBBFAC,,, | Performed by: DERMATOLOGY

## 2023-03-23 PROCEDURE — 3288F PR FALLS RISK ASSESSMENT DOCUMENTED: ICD-10-PCS | Mod: CPTII,S$GLB,, | Performed by: DERMATOLOGY

## 2023-03-23 PROCEDURE — 1160F PR REVIEW ALL MEDS BY PRESCRIBER/CLIN PHARMACIST DOCUMENTED: ICD-10-PCS | Mod: CPTII,S$GLB,, | Performed by: DERMATOLOGY

## 2023-03-29 ENCOUNTER — TELEPHONE (OUTPATIENT)
Dept: DERMATOLOGY | Facility: CLINIC | Age: 73
End: 2023-03-29
Payer: MEDICARE

## 2023-03-29 NOTE — TELEPHONE ENCOUNTER
Attempted to return call. No answer message. Message left/   ----- Message from Julia Gonzales MA sent at 3/24/2023  4:15 PM CDT -----  Regarding: FW: RX Update  Contact: Basia    ----- Message -----  From: Shelbie Culver  Sent: 3/24/2023   4:14 PM CDT  To: [facility] Staff  Subject: RX Update                                        Kikemoela says her medication arrived at 4:15 03/24/2023 and she will have Dr. Linton office show her how to use it. She will need to have a follow up visit scheduled.     Basia can be reached at  [phone] (work)     Thanks

## 2023-05-24 ENCOUNTER — PATIENT MESSAGE (OUTPATIENT)
Dept: PODIATRY | Facility: CLINIC | Age: 73
End: 2023-05-24
Payer: MEDICARE

## 2023-06-02 NOTE — PROGRESS NOTES
Subjective:       Patient ID:  Basia RENEE is a 72 y.o. female who presents for   Chief Complaint   Patient presents with    Follow-up     Patient state she is suppose to start cosentyx.      Hx of palmoplantar psoriasis, last seen on 2/2/23.  Patient Assistance with [employer] attempted to reach pt 3 days ago and was not able to reach pt for a good address.  Pt to start Cosentyx.  Initially dx as fungal infection by Dr. Linton.     Prior treatments: calcipotriene, fluocinolone, urea (improving), vaseline        Review of Systems   Constitutional:  Negative for fever and chills.   Gastrointestinal:  Negative for nausea and vomiting.   Skin:  Positive for itching, rash and activity-related sunscreen use. Negative for daily sunscreen use and recent sunburn.   Hematologic/Lymphatic: Does not bruise/bleed easily.      Objective:    Physical Exam   Constitutional: She appears well-developed and well-nourished. No distress.   Neurological: She is alert and oriented to person, place, and time. She is not disoriented.   Psychiatric: She has a normal mood and affect.   Skin:   Areas Examined (abnormalities noted in diagram):   Head / Face Inspection Performed  Neck Inspection Performed  RUE Inspected  LUE Inspection Performed  Nails and Digits Inspection Performed       Medical exam deferred      Assessment / Plan:        Palmoplantar pustular psoriasis  Encounter for long-term (current) use of medications  Pt will return for Cosentyx IM training.  Will f/u in 2 months once on Cosentyx.            Follow up for nurse's visit for training.   Routine safety standards initiated.  Routine nursing standards initiated.

## 2023-06-06 ENCOUNTER — OFFICE VISIT (OUTPATIENT)
Dept: PODIATRY | Facility: CLINIC | Age: 73
End: 2023-06-06
Payer: MEDICARE

## 2023-06-06 VITALS — WEIGHT: 199.94 LBS | HEIGHT: 65 IN | BODY MASS INDEX: 33.31 KG/M2

## 2023-06-06 DIAGNOSIS — B35.1 DERMATOPHYTOSIS OF NAIL: ICD-10-CM

## 2023-06-06 DIAGNOSIS — L40.3 PALMOPLANTAR PUSTULAR PSORIASIS: ICD-10-CM

## 2023-06-06 DIAGNOSIS — E11.9 ENCOUNTER FOR COMPREHENSIVE DIABETIC FOOT EXAMINATION, TYPE 2 DIABETES MELLITUS: Primary | ICD-10-CM

## 2023-06-06 PROCEDURE — 1126F PR PAIN SEVERITY QUANTIFIED, NO PAIN PRESENT: ICD-10-PCS | Mod: CPTII,S$GLB,, | Performed by: PODIATRIST

## 2023-06-06 PROCEDURE — 1101F PR PT FALLS ASSESS DOC 0-1 FALLS W/OUT INJ PAST YR: ICD-10-PCS | Mod: CPTII,S$GLB,, | Performed by: PODIATRIST

## 2023-06-06 PROCEDURE — 1159F MED LIST DOCD IN RCRD: CPT | Mod: CPTII,S$GLB,, | Performed by: PODIATRIST

## 2023-06-06 PROCEDURE — 3288F PR FALLS RISK ASSESSMENT DOCUMENTED: ICD-10-PCS | Mod: CPTII,S$GLB,, | Performed by: PODIATRIST

## 2023-06-06 PROCEDURE — 3288F FALL RISK ASSESSMENT DOCD: CPT | Mod: CPTII,S$GLB,, | Performed by: PODIATRIST

## 2023-06-06 PROCEDURE — 3008F PR BODY MASS INDEX (BMI) DOCUMENTED: ICD-10-PCS | Mod: CPTII,S$GLB,, | Performed by: PODIATRIST

## 2023-06-06 PROCEDURE — 99213 OFFICE O/P EST LOW 20 MIN: CPT | Mod: 25,S$GLB,, | Performed by: PODIATRIST

## 2023-06-06 PROCEDURE — 99999 PR PBB SHADOW E&M-EST. PATIENT-LVL III: ICD-10-PCS | Mod: PBBFAC,,, | Performed by: PODIATRIST

## 2023-06-06 PROCEDURE — 1159F PR MEDICATION LIST DOCUMENTED IN MEDICAL RECORD: ICD-10-PCS | Mod: CPTII,S$GLB,, | Performed by: PODIATRIST

## 2023-06-06 PROCEDURE — 1126F AMNT PAIN NOTED NONE PRSNT: CPT | Mod: CPTII,S$GLB,, | Performed by: PODIATRIST

## 2023-06-06 PROCEDURE — 99999 PR PBB SHADOW E&M-EST. PATIENT-LVL III: CPT | Mod: PBBFAC,,, | Performed by: PODIATRIST

## 2023-06-06 PROCEDURE — 1160F PR REVIEW ALL MEDS BY PRESCRIBER/CLIN PHARMACIST DOCUMENTED: ICD-10-PCS | Mod: CPTII,S$GLB,, | Performed by: PODIATRIST

## 2023-06-06 PROCEDURE — 3008F BODY MASS INDEX DOCD: CPT | Mod: CPTII,S$GLB,, | Performed by: PODIATRIST

## 2023-06-06 PROCEDURE — 99213 PR OFFICE/OUTPT VISIT, EST, LEVL III, 20-29 MIN: ICD-10-PCS | Mod: 25,S$GLB,, | Performed by: PODIATRIST

## 2023-06-06 PROCEDURE — 1160F RVW MEDS BY RX/DR IN RCRD: CPT | Mod: CPTII,S$GLB,, | Performed by: PODIATRIST

## 2023-06-06 PROCEDURE — 1101F PT FALLS ASSESS-DOCD LE1/YR: CPT | Mod: CPTII,S$GLB,, | Performed by: PODIATRIST

## 2023-06-11 NOTE — PROGRESS NOTES
Subjective:     Patient ID: Basia Seth is a 73 y.o. female.    Chief Complaint: Nail Care (Pt c/o right hallux toe, Diabetic pt wears casual shoes, PCP Dr Jefferson last seen 3-29-23)    Basia is a 73 y.o. female who presents to the clinic upon referral from Dr. Johnson ref. provider found  for evaluation and treatment of diabetic feet. Basia has a past medical history of Cataract, Diabetes mellitus, type 2, GERD (gastroesophageal reflux disease), and Hypertension. Patient relates no major problem with feet. Only complaints today consist of painful right hallux nail. Patient states she tried to cut nail but to painful. Patient states she was recently diagnosed with Diabetes. Patient has no other pedal complaints at this time.    PCP: Belkis Jefferson MD    Date Last Seen by PCP: 03/29/2023    Current shoe gear: Casual shoes    No results found for: HGBA1C      Patient Active Problem List   Diagnosis    GERD (gastroesophageal reflux disease)    Hypertension    Cataract       Medication List with Changes/Refills   Current Medications    AMLODIPINE (NORVASC) 5 MG TABLET    Take 5 mg by mouth.    AMLODIPINE (NORVASC) 5 MG TABLET    Take 5 mg by mouth.    CALCIPOTRIENE (DOVONOX) 0.005 % CREAM    Apply topically 2 (two) times daily as needed.    CHOLECALCIFEROL, VITAMIN D3, (VITAMIN D3) 50 MCG (2,000 UNIT) CAP CAPSULE    Take by mouth.    COSENTYX PEN, 2 PENS, 150 MG/ML PNIJ    Inject 300mg SQ qweek x 5 weeks then inject 300mg SQ q 4 weeks    COSENTYX PEN, 2 PENS, 150 MG/ML PNIJ    Inject 300mg (2 pens) SQ q 4 weeks    FLUOCINOLONE (DERMA-SMOOTHE) 0.01 % EXTERNAL OIL    APPLY TO AFFECTED AREA ON SKIN TWICE A DAY AS NEEDED    HYDROCHLOROTHIAZIDE (HYDRODIURIL) 12.5 MG TAB    Take 1 tablet by mouth once daily.    HYDROCHLOROTHIAZIDE (MICROZIDE) 12.5 MG CAPSULE    Take 12.5 mg by mouth.    SIMVASTATIN (ZOCOR) 5 MG TABLET    Take 5 mg by mouth.    SIMVASTATIN (ZOCOR) 5 MG TABLET    Take 1 tablet by mouth every evening.  "      Review of patient's allergies indicates:   Allergen Reactions    Tree and shrub pollen Other (See Comments)     .       History reviewed. No pertinent surgical history.    Family History   Problem Relation Age of Onset    Hypertension Mother     Hypertension Father        Social History     Socioeconomic History    Marital status:    Tobacco Use    Smoking status: Never     Passive exposure: Never    Smokeless tobacco: Never   Substance and Sexual Activity    Alcohol use: Not Currently    Drug use: Never       Vitals:    06/06/23 1418   Weight: 90.7 kg (199 lb 15.3 oz)   Height: 5' 5" (1.651 m)   PainSc: 0-No pain       No results found for: HGBA1C    Review of Systems   Constitutional:  Negative for chills and fever.   Respiratory:  Negative for shortness of breath.    Cardiovascular:  Negative for chest pain, palpitations, orthopnea, claudication and leg swelling.   Gastrointestinal:  Negative for diarrhea, nausea and vomiting.   Musculoskeletal:  Negative for joint pain.   Skin:  Negative for rash.   Neurological:  Negative for dizziness, tingling, sensory change, focal weakness and weakness.   Psychiatric/Behavioral: Negative.             Objective:      PHYSICAL EXAM: Apperance: Alert and orient in no distress,well developed, and with good attention to grooming and body habits  Patient presents ambulating in tennis shoes.   Lower Extremity Exam  VASCULAR: Dorsalis pedis pulses 2/4 bilateral and Posterior Tibial pulses 2/4 bilateral. Capillary fill time <4 seconds bilateral. No edema observed bilateral. Skin temperature of the lower extremities is warm to warm, proximal to distal. Hair growth WNL bilateral.  DERMATOLOGICAL: No skin rash, subcutaneous nodules, lesions or ulcers observed. Decreased dry and scaly skin noted plantarly bilateral in moccasin distribution. Decreased dry skin fissures noted to bilateral heels. Webspaces 1,2,3,4 bilateral are clean, dry and without evidence of break in skin " integrity.  Nails thickened, and discolored with subungual debris.   NEUROLOGICAL: Light touch, sharp-dull, proprioception all present and equal bilaterally.  Vibratory sensation intact at bilateral hallux. Protective sensation intact at all 10 at sites as tested with a [place] 5.07 monofilament.   MUSCULOSKELETAL: Muscle strength is 5/5 for foot inverters, everters, plantarflexors, and dorsiflexors. Muscle tone is normal. No pain on palpation of right hallux nail.        Assessment:       ICD-10-CM ICD-9-CM   1. Encounter for comprehensive diabetic foot examination, type 2 diabetes mellitus  E11.9 250.00   2. Dermatophytosis of nail  B35.1 110.1   3. Palmoplantar pustular psoriasis  L40.3 696.1       Plan:   Encounter for comprehensive diabetic foot examination, type 2 diabetes mellitus    Dermatophytosis of nail    Palmoplantar pustular psoriasis      I counseled the patient on her conditions, regarding findings of my examination, my impressions, and usual treatment plan.   This visit spent on counseling and coordination of care.  Appointment spent on education about the diabetic foot, neuropathy, and prevention of limb loss.  Shoe inspection. Diabetic Foot Education. Patient reminded of the importance of good nutrition and blood sugar control to help prevent podiatric complications of diabetes. Patient instructed on proper foot hygeine. We discussed wearing proper shoe gear, daily foot inspections, never walking without protective shoe gear, never putting sharp instruments to feet.    Conservatively we did discuss proper nail cutting for incurvated toenails. Surgically we briefly discussed temporary vs. permanent nail avulsion procedures with patient. The patient elects for conservative management at this time.   Patient instructed to continue Cosentyx as prescribed by dermatology.    Patient  will continue to monitor the areas daily, inspect feet, wear protective shoe gear when ambulatory, moisturizer  to maintain skin integrity. Patient reminded of the importance of good nutrition and blood sugar control to help prevent podiatric complications of diabetes.  Patient to return 4 months or sooner if needed.          Angelia Ndiaye DPM  [facility]

## 2023-08-15 ENCOUNTER — OFFICE VISIT (OUTPATIENT)
Dept: PODIATRY | Facility: CLINIC | Age: 73
End: 2023-08-15
Payer: MEDICARE

## 2023-08-15 VITALS — HEIGHT: 65 IN | BODY MASS INDEX: 33.31 KG/M2 | WEIGHT: 199.94 LBS

## 2023-08-15 DIAGNOSIS — E11.21 TYPE 2 DIABETES MELLITUS WITH DIABETIC NEPHROPATHY, WITHOUT LONG-TERM CURRENT USE OF INSULIN: Primary | ICD-10-CM

## 2023-08-15 DIAGNOSIS — B35.1 DERMATOPHYTOSIS OF NAIL: ICD-10-CM

## 2023-08-15 DIAGNOSIS — L40.3 PALMOPLANTAR PUSTULAR PSORIASIS: ICD-10-CM

## 2023-08-15 PROCEDURE — 99499 NO LOS: ICD-10-PCS | Mod: S$GLB,,, | Performed by: PODIATRIST

## 2023-08-15 PROCEDURE — 99999 PR PBB SHADOW E&M-EST. PATIENT-LVL III: CPT | Mod: PBBFAC,,, | Performed by: PODIATRIST

## 2023-08-15 PROCEDURE — 99999 PR PBB SHADOW E&M-EST. PATIENT-LVL III: ICD-10-PCS | Mod: PBBFAC,,, | Performed by: PODIATRIST

## 2023-08-15 PROCEDURE — 99499 UNLISTED E&M SERVICE: CPT | Mod: S$GLB,,, | Performed by: PODIATRIST

## 2023-08-24 NOTE — PROGRESS NOTES
"Subjective:     Patient ID: Basia Seth is a 73 y.o. female.    Chief Complaint: Follow-up (Diabetic pt wears tennis shoes, PCP Dr. Jefferson last seen 23)    Basia is a 73 y.o. female who presents to the clinic upon referral from Dr. Johnson ref. provider found  for evaluation and treatment of diabetic feet. Basia has a past medical history of Cataract, Diabetes mellitus, type 2, GERD (gastroesophageal reflux disease), and Hypertension. Patient presents today for foot check. Patient has no other pedal complaints at this time.    PCP: Belkis Jefferson MD    Date Last Seen by PCP: 2023      Current shoe gear: Casual shoes    No results found for: "HGBA1C"      Patient Active Problem List   Diagnosis    GERD (gastroesophageal reflux disease)    Hypertension    Cataract       Medication List with Changes/Refills   Current Medications    AMLODIPINE (NORVASC) 5 MG TABLET    Take 5 mg by mouth.    AMLODIPINE (NORVASC) 5 MG TABLET    Take 5 mg by mouth.    CALCIPOTRIENE (DOVONOX) 0.005 % CREAM    Apply topically 2 (two) times daily as needed.    CHOLECALCIFEROL, VITAMIN D3, (VITAMIN D3) 50 MCG (2,000 UNIT) CAP CAPSULE    Take by mouth.    COSENTYX PEN, 2 PENS, 150 MG/ML PNIJ    Inject 300mg SQ qweek x 5 weeks then inject 300mg SQ q 4 weeks    COSENTYX PEN, 2 PENS, 150 MG/ML PNIJ    Inject 300mg (2 pens) SQ q 4 weeks    FLUOCINOLONE (DERMA-SMOOTHE) 0.01 % EXTERNAL OIL    APPLY TO AFFECTED AREA ON SKIN TWICE A DAY AS NEEDED    HYDROCHLOROTHIAZIDE (HYDRODIURIL) 12.5 MG TAB    Take 1 tablet by mouth once daily.    HYDROCHLOROTHIAZIDE (MICROZIDE) 12.5 MG CAPSULE    Take 12.5 mg by mouth.    MOUNJARO 2.5 MG/0.5 ML PNIJ    SMARTSI.5 Milligram(s) SUB-Q Once a Week    SIMVASTATIN (ZOCOR) 5 MG TABLET    Take 5 mg by mouth.    SIMVASTATIN (ZOCOR) 5 MG TABLET    Take 1 tablet by mouth every evening.       Review of patient's allergies indicates:   Allergen Reactions    Tree and shrub pollen Other (See Comments)     . " "      History reviewed. No pertinent surgical history.    Family History   Problem Relation Age of Onset    Hypertension Mother     Hypertension Father        Social History     Socioeconomic History    Marital status:    Tobacco Use    Smoking status: Never     Passive exposure: Never    Smokeless tobacco: Never   Substance and Sexual Activity    Alcohol use: Not Currently    Drug use: Never       Vitals:    08/15/23 1351   Weight: 90.7 kg (199 lb 15.3 oz)   Height: 5' 5" (1.651 m)   PainSc: 0-No pain       No results found for: "HGBA1C"    Review of Systems   Constitutional:  Negative for chills and fever.   Respiratory:  Negative for shortness of breath.    Cardiovascular:  Negative for chest pain, palpitations, orthopnea, claudication and leg swelling.   Gastrointestinal:  Negative for diarrhea, nausea and vomiting.   Musculoskeletal:  Negative for joint pain.   Skin:  Negative for rash.   Neurological:  Negative for dizziness, tingling, sensory change, focal weakness and weakness.   Psychiatric/Behavioral: Negative.               Objective:      PHYSICAL EXAM: Apperance: Alert and orient in no distress,well developed, and with good attention to grooming and body habits  Patient presents ambulating in tennis shoes.   Lower Extremity Exam  VASCULAR: Dorsalis pedis pulses 2/4 bilateral and Posterior Tibial pulses 2/4 bilateral. Capillary fill time <4 seconds bilateral. No edema observed bilateral. Skin temperature of the lower extremities is warm to warm, proximal to distal. Hair growth WNL bilateral.  DERMATOLOGICAL: No skin rash, subcutaneous nodules, lesions or ulcers observed. Decreased dry and scaly skin noted plantarly bilateral in moccasin distribution. Minimal dry skin fissures noted to bilateral heels. Webspaces 1,2,3,4 bilateral are clean, dry and without evidence of break in skin integrity.  Nails thickened, and discolored with subungual debris.   NEUROLOGICAL: Light touch, sharp-dull, " proprioception all present and equal bilaterally.  Vibratory sensation intact at bilateral hallux. Protective sensation intact at all 10 at sites as tested with a [place] 5.07 monofilament.   MUSCULOSKELETAL: Muscle strength is 5/5 for foot inverters, everters, plantarflexors, and dorsiflexors. Muscle tone is normal. No pain on palpation of right hallux nail.        Assessment:       ICD-10-CM ICD-9-CM   1. Type 2 diabetes mellitus with diabetic nephropathy, without long-term current use of insulin  E11.21 250.40     583.81   2. Dermatophytosis of nail  B35.1 110.1   3. Palmoplantar pustular psoriasis  L40.3 696.1       Plan:   Type 2 diabetes mellitus with diabetic nephropathy, without long-term current use of insulin    Dermatophytosis of nail    Palmoplantar pustular psoriasis      I counseled the patient on her conditions, regarding findings of my examination, my impressions, and usual treatment plan.   Appointment spent on education about the diabetic foot, neuropathy, and prevention of limb loss.  Shoe inspection. Diabetic Foot Education. Patient reminded of the importance of good nutrition and blood sugar control to help prevent podiatric complications of diabetes. Patient instructed on proper foot hygeine. We discussed wearing proper shoe gear, daily foot inspections, never walking without protective shoe gear, never putting sharp instruments to feet.    Conservatively we did discuss proper nail cutting for incurvated toenails. Surgically we briefly discussed temporary vs. permanent nail avulsion procedures with patient. The patient elects for conservative management at this time.   Patient instructed to continue Cosentyx as prescribed by dermatology.    Patient  will continue to monitor the areas daily, inspect feet, wear protective shoe gear when ambulatory, moisturizer to maintain skin integrity. Patient reminded of the importance of good nutrition and blood sugar control to help prevent  podiatric complications of diabetes.  Patient to return 4 months or sooner if needed.          Angelia Ndiaye DPM  [facility]

## 2023-10-31 ENCOUNTER — OFFICE VISIT (OUTPATIENT)
Dept: PODIATRY | Facility: CLINIC | Age: 73
End: 2023-10-31
Payer: MEDICARE

## 2023-10-31 VITALS — BODY MASS INDEX: 33.16 KG/M2 | WEIGHT: 199.06 LBS | HEIGHT: 65 IN

## 2023-10-31 DIAGNOSIS — E11.21 TYPE 2 DIABETES MELLITUS WITH DIABETIC NEPHROPATHY, WITHOUT LONG-TERM CURRENT USE OF INSULIN: Primary | ICD-10-CM

## 2023-10-31 DIAGNOSIS — L40.3 PALMOPLANTAR PUSTULAR PSORIASIS: ICD-10-CM

## 2023-10-31 DIAGNOSIS — B35.1 DERMATOPHYTOSIS OF NAIL: ICD-10-CM

## 2023-10-31 PROCEDURE — 99999 PR PBB SHADOW E&M-EST. PATIENT-LVL III: CPT | Mod: PBBFAC,,, | Performed by: PODIATRIST

## 2023-10-31 PROCEDURE — 99499 UNLISTED E&M SERVICE: CPT | Mod: S$GLB,,, | Performed by: PODIATRIST

## 2023-10-31 PROCEDURE — 99499 NO LOS: ICD-10-PCS | Mod: S$GLB,,, | Performed by: PODIATRIST

## 2023-10-31 PROCEDURE — 99999 PR PBB SHADOW E&M-EST. PATIENT-LVL III: ICD-10-PCS | Mod: PBBFAC,,, | Performed by: PODIATRIST

## 2023-11-09 NOTE — PROGRESS NOTES
"Subjective:     Patient ID: Basia Seth is a 73 y.o. female.    Chief Complaint: Nail Care (Nail care (diabetic pt, wearing casual shoes, last seen Pcp Dr. Jefferson 23))    Basia is a 73 y.o. female who presents to the clinic upon referral from Dr. Johnson ref. provider found  for evaluation and treatment of diabetic feet. Basia has a past medical history of Cataract, Diabetes mellitus, type 2, GERD (gastroesophageal reflux disease), and Hypertension. Patient presents today for foot check. Patient states her psoriasis has continued to improve and is still on the Cosentyx as prescribed by her dermatologist. Patient has no other pedal complaints at this time.    PCP: Belkis Jefferson MD    Date Last Seen by PCP: 2023      Current shoe gear: Casual shoes    No results found for: "HGBA1C"      Patient Active Problem List   Diagnosis    GERD (gastroesophageal reflux disease)    Hypertension    Cataract       Medication List with Changes/Refills   Current Medications    AMLODIPINE (NORVASC) 5 MG TABLET    Take 5 mg by mouth.    AMLODIPINE (NORVASC) 5 MG TABLET        CALCIPOTRIENE (DOVONOX) 0.005 % CREAM    Apply topically 2 (two) times daily as needed.    CHOLECALCIFEROL, VITAMIN D3, (VITAMIN D3) 50 MCG (2,000 UNIT) CAP CAPSULE    Take by mouth.    COSENTYX PEN, 2 PENS, 150 MG/ML PNIJ    Inject 300mg (2 pens) SQ q 4 weeks    COSENTYX PEN, 2 PENS, 150 MG/ML PNIJ    Inject 300mg SQ q 4 weeks.    FLUOCINOLONE (DERMA-SMOOTHE) 0.01 % EXTERNAL OIL    APPLY TO AFFECTED AREA ON SKIN TWICE A DAY AS NEEDED    HYDROCHLOROTHIAZIDE (HYDRODIURIL) 12.5 MG TAB    Take 1 tablet by mouth once daily.    MOUNJARO 2.5 MG/0.5 ML PNIJ    SMARTSI.5 Milligram(s) SUB-Q Once a Week    MV-MIN-FOLIC ACID-LUTEIN 500-250 MCG CHEW    Take by mouth.    SIMVASTATIN (ZOCOR) 5 MG TABLET    Take 5 mg by mouth.       Review of patient's allergies indicates:   Allergen Reactions    Tree and shrub pollen Other (See Comments)     . " "      History reviewed. No pertinent surgical history.    Family History   Problem Relation Age of Onset    Hypertension Mother     Hypertension Father        Social History     Socioeconomic History    Marital status:    Tobacco Use    Smoking status: Never     Passive exposure: Never    Smokeless tobacco: Never   Substance and Sexual Activity    Alcohol use: Not Currently    Drug use: Never       Vitals:    10/31/23 1410   Weight: 90.3 kg (199 lb 1.2 oz)   Height: 5' 5" (1.651 m)   PainSc: 0-No pain       No results found for: "HGBA1C"    Review of Systems   Constitutional:  Negative for chills and fever.   Respiratory:  Negative for shortness of breath.    Cardiovascular:  Negative for chest pain, palpitations, orthopnea, claudication and leg swelling.   Gastrointestinal:  Negative for diarrhea, nausea and vomiting.   Musculoskeletal:  Negative for joint pain.   Skin:  Negative for rash.   Neurological:  Negative for dizziness, tingling, sensory change, focal weakness and weakness.   Psychiatric/Behavioral: Negative.               Objective:      PHYSICAL EXAM: Apperance: Alert and orient in no distress,well developed, and with good attention to grooming and body habits  Patient presents ambulating in tennis shoes.   Lower Extremity Exam  VASCULAR: Dorsalis pedis pulses 2/4 bilateral and Posterior Tibial pulses 2/4 bilateral. Capillary fill time <4 seconds bilateral. No edema observed bilateral. Skin temperature of the lower extremities is warm to warm, proximal to distal. Hair growth WNL bilateral.  DERMATOLOGICAL: No skin rash, subcutaneous nodules, lesions or ulcers observed. Decreased dry and scaly skin noted plantarly bilateral in moccasin distribution. Minimal dry skin fissures noted to bilateral heels. Webspaces 1,2,3,4 bilateral are clean, dry and without evidence of break in skin integrity.  Nails 1 bilateral thickened, and discolored with subungual debris. Nails 2,3,4,5 bilateral normal length " and thickness.   NEUROLOGICAL: Light touch, sharp-dull, proprioception all present and equal bilaterally.  Vibratory sensation intact at bilateral hallux. Protective sensation intact at all 10 at sites as tested with a [place]Mon[place] 5.07 monofilament.   MUSCULOSKELETAL: Muscle strength is 5/5 for foot inverters, everters, plantarflexors, and dorsiflexors. Muscle tone is normal. No pain on palpation of right hallux nail.        Assessment:       ICD-10-CM ICD-9-CM   1. Type 2 diabetes mellitus with diabetic nephropathy, without long-term current use of insulin  E11.21 250.40     583.81   2. Dermatophytosis of nail  B35.1 110.1   3. Palmoplantar pustular psoriasis  L40.3 696.1       Plan:   Type 2 diabetes mellitus with diabetic nephropathy, without long-term current use of insulin    Dermatophytosis of nail    Palmoplantar pustular psoriasis      I counseled the patient on her conditions, regarding findings of my examination, my impressions, and usual treatment plan.   Appointment spent on education about the diabetic foot, neuropathy, and prevention of limb loss.  Shoe inspection. Diabetic Foot Education. Patient reminded of the importance of good nutrition and blood sugar control to help prevent podiatric complications of diabetes. Patient instructed on proper foot hygeine. We discussed wearing proper shoe gear, daily foot inspections, never walking without protective shoe gear, never putting sharp instruments to feet.    Conservatively we did discuss proper nail cutting for incurvated toenails. Surgically we briefly discussed temporary vs. permanent nail avulsion procedures with patient. The patient elects for conservative management at this time.   Patient instructed to continue Cosentyx as prescribed by dermatology.    Patient  will continue to monitor the areas daily, inspect feet, wear protective shoe gear when ambulatory, moisturizer to maintain skin integrity. Patient reminded of the importance of good  nutrition and blood sugar control to help prevent podiatric complications of diabetes.  Patient to return 4 months or sooner if needed.          Angelia Ndiaye DPM  [facility]

## 2023-11-20 ENCOUNTER — TELEPHONE (OUTPATIENT)
Dept: DERMATOLOGY | Facility: CLINIC | Age: 73
End: 2023-11-20
Payer: MEDICARE

## 2023-11-20 NOTE — TELEPHONE ENCOUNTER
Refill request forwarded to provider   ----- Message from Airam Coelho sent at 11/20/2023 11:43 AM CST -----  Contact: self  ..Type:  RX Refill Request    Who Called: Zahra Seth  Refill or New Rx: Refill   RX Name and Strength:COSENTYX PEN, 2 PENS, 150 mg/mL PnIj  How is the patient currently taking it? (ex. 1XDay):1xmonth   Is this a 30 day or 90 day RX:   Preferred Pharmacy with phone number:.  [id number] [place] - [address] [place] [address]  [address] [address]  [place] [address]  Phone: [phone] Fax: [phone]  Local or Mail Order:local   Ordering Provider: Brad   Would the patient rather a call back or a response via [facility]?  Call back   Best Call Back Number: .[phone]   Additional Information:

## 2023-11-21 ENCOUNTER — PATIENT MESSAGE (OUTPATIENT)
Dept: DERMATOLOGY | Facility: CLINIC | Age: 73
End: 2023-11-21
Payer: MEDICARE

## 2024-05-21 ENCOUNTER — OFFICE VISIT (OUTPATIENT)
Dept: PODIATRY | Facility: CLINIC | Age: 74
End: 2024-05-21
Payer: MEDICARE

## 2024-05-21 VITALS — BODY MASS INDEX: 33.16 KG/M2 | HEIGHT: 65 IN | WEIGHT: 199.06 LBS

## 2024-05-21 DIAGNOSIS — B35.1 DERMATOPHYTOSIS OF NAIL: ICD-10-CM

## 2024-05-21 DIAGNOSIS — E11.21 TYPE 2 DIABETES MELLITUS WITH DIABETIC NEPHROPATHY, WITHOUT LONG-TERM CURRENT USE OF INSULIN: Primary | ICD-10-CM

## 2024-05-21 DIAGNOSIS — L40.3 PALMOPLANTAR PUSTULAR PSORIASIS: ICD-10-CM

## 2024-05-21 PROCEDURE — 1101F PT FALLS ASSESS-DOCD LE1/YR: CPT | Mod: CPTII,S$GLB,, | Performed by: PODIATRIST

## 2024-05-21 PROCEDURE — 99999 PR PBB SHADOW E&M-EST. PATIENT-LVL III: CPT | Mod: PBBFAC,,, | Performed by: PODIATRIST

## 2024-05-21 PROCEDURE — 99213 OFFICE O/P EST LOW 20 MIN: CPT | Mod: S$GLB,,, | Performed by: PODIATRIST

## 2024-05-21 PROCEDURE — 1160F RVW MEDS BY RX/DR IN RCRD: CPT | Mod: CPTII,S$GLB,, | Performed by: PODIATRIST

## 2024-05-21 PROCEDURE — 1126F AMNT PAIN NOTED NONE PRSNT: CPT | Mod: CPTII,S$GLB,, | Performed by: PODIATRIST

## 2024-05-21 PROCEDURE — 3288F FALL RISK ASSESSMENT DOCD: CPT | Mod: CPTII,S$GLB,, | Performed by: PODIATRIST

## 2024-05-21 PROCEDURE — 1159F MED LIST DOCD IN RCRD: CPT | Mod: CPTII,S$GLB,, | Performed by: PODIATRIST

## 2024-05-21 NOTE — PROGRESS NOTES
"Subjective:     Patient ID: Basia Seth is a 74 y.o. female.    Chief Complaint: Nail Care (Diabetic pt wears sandals, PCP Dr. Jefferson last seen 2-12-24)    Basia is a 74 y.o. female who presents to the clinic upon referral from Dr. Elizabeth bernard. provider found  for evaluation and treatment of diabetic feet. Basia has a past medical history of Cataract, Diabetes mellitus, type 2, GERD (gastroesophageal reflux disease), and Hypertension. Patient presents today for foot check. Patient states her psoriasis has continued to improve and is still on the Cosentyx as prescribed by her dermatologist. Patient has no other pedal complaints at this time.    PCP: Belkis Jefferson MD    Date Last Seen by PCP: 02/12/2024      Current shoe gear: Casual shoes    No results found for: "HGBA1C"      Patient Active Problem List   Diagnosis    GERD (gastroesophageal reflux disease)    Hypertension    Cataract       Medication List with Changes/Refills   Current Medications    ACITRETIN (SORIATANE) 10 MG CAPSULE    Take 1 capsule (10 mg total) by mouth 2 (two) times a day.    FLUOCINOLONE (DERMA-SMOOTHE) 0.01 % EXTERNAL OIL    APPLY TO AFFECTED AREA ON SKIN TWICE A DAY AS NEEDED    NIFEDIPINE (PROCARDIA-XL) 90 MG (OSM) 24 HR TABLET    Take 90 mg by mouth.   Discontinued Medications    TIRZEPATIDE 5 MG/0.5 ML PNIJ    Inject 5 mg into the skin every 7 days Refrigerate       Review of patient's allergies indicates:   Allergen Reactions    Tree and shrub pollen Other (See Comments)     .       History reviewed. No pertinent surgical history.    Family History   Problem Relation Name Age of Onset    Hypertension Mother      Hypertension Father         Social History     Socioeconomic History    Marital status:    Tobacco Use    Smoking status: Never     Passive exposure: Never    Smokeless tobacco: Never   Substance and Sexual Activity    Alcohol use: Not Currently    Drug use: Never     Social Determinants of Health     Financial " "Resource Strain: Low Risk  (1/26/2024)    Received from [facility], [facility]    Overall Financial Resource Strain (CARDIA)     Difficulty of Paying Living Expenses: Not hard at all   Food Insecurity: No Food Insecurity (1/26/2024)    [place] [facility]    Hunger Vital Sign     Worried About Running Out of Food in the Last Year: Never true     Ran Out of Food in the Last Year: Never true   Transportation Needs: No Transportation Needs (1/26/2024)    Received from [facility]    PRAPARE - Transportation     Lack of Transportation (Medical): No     Lack of Transportation (Non-Medical): No   Physical Activity: Sufficiently Active (1/26/2024)    Received from [facility]    Exercise Vital Sign     Days of Exercise per Week: 3 days     Minutes of Exercise per Session: 60 min   Stress: No Stress Concern Present (1/26/2024)    Received from [facility], [facility]    [employer] - Occupational Stress Questionnaire     Feeling of Stress : Not at all   Housing Stability: Low Risk  (1/26/2024)    Received from [facility], [facility]    Housing Stability Vital Sign     Unable to Pay for Housing in the Last Year: No     Number of Places Lived in the Last Year: 1     Unstable Housing in the Last Year: No       Vitals:    05/21/24 1337   Weight: 90.3 kg (199 lb 1.2 oz)   Height: 5' 5" (1.651 m)   PainSc: 0-No pain       No results found for: "HGBA1C"    Review of Systems   Constitutional:  Negative for chills and fever.   Respiratory:  Negative for shortness of breath.    Cardiovascular:  Negative for chest pain, palpitations, orthopnea, claudication and leg swelling.   Gastrointestinal:  Negative for diarrhea, nausea and vomiting.   Musculoskeletal:  Negative for joint pain.   Skin:  Negative for " rash.   Neurological:  Negative for dizziness, tingling, sensory change, focal weakness and weakness.   Psychiatric/Behavioral: Negative.               Objective:      PHYSICAL EXAM: Apperance: Alert and orient in no distress,well developed, and with good attention to grooming and body habits  Patient presents ambulating in tennis shoes.   Lower Extremity Exam  VASCULAR: Dorsalis pedis pulses 2/4 bilateral and Posterior Tibial pulses 2/4 bilateral. Capillary fill time <4 seconds bilateral. No edema observed bilateral. Skin temperature of the lower extremities is warm to warm, proximal to distal. Hair growth WNL bilateral.  DERMATOLOGICAL: No skin rash, subcutaneous nodules, lesions or ulcers observed. Minimal dry and scaly skin noted plantarly bilateral in moccasin distribution. Minimal dry skin fissures noted to bilateral heels. Webspaces 1,2,3,4 bilateral are clean, dry and without evidence of break in skin integrity.  Nails 1 bilateral thickened, and discolored with subungual debris. Nails 2,3,4,5 bilateral normal length and thickness.   NEUROLOGICAL: Light touch, sharp-dull, proprioception all present and equal bilaterally.  Vibratory sensation intact at bilateral hallux. Protective sensation intact at all 10 at sites as tested with a [place] 5.07 monofilament.   MUSCULOSKELETAL: Muscle strength is 5/5 for foot inverters, everters, plantarflexors, and dorsiflexors. Muscle tone is normal. No pain on palpation of right hallux nail.        Assessment:       ICD-10-CM ICD-9-CM   1. Type 2 diabetes mellitus with diabetic nephropathy, without long-term current use of insulin  E11.21 250.40     583.81   2. Dermatophytosis of nail  B35.1 110.1   3. Palmoplantar pustular psoriasis  L40.3 696.1         Plan:   Type 2 diabetes mellitus with diabetic nephropathy, without long-term current use of insulin    Dermatophytosis of nail    Palmoplantar pustular psoriasis    I counseled the patient on her conditions,  regarding findings of my examination, my impressions, and usual treatment plan.   Appointment spent on education about the diabetic foot, neuropathy, and prevention of limb loss.  Shoe inspection. Diabetic Foot Education. Patient reminded of the importance of good nutrition and blood sugar control to help prevent podiatric complications of diabetes. Patient instructed on proper foot hygeine. We discussed wearing proper shoe gear, daily foot inspections, never walking without protective shoe gear, never putting sharp instruments to feet.    Conservatively we did discuss proper nail cutting for incurvated toenails. Surgically we briefly discussed temporary vs. permanent nail avulsion procedures with patient. The patient elects for conservative management at this time.   Patient instructed to continue Cosentyx as prescribed by dermatology.    Patient  will continue to monitor the areas daily, inspect feet, wear protective shoe gear when ambulatory, moisturizer to maintain skin integrity. Patient reminded of the importance of good nutrition and blood sugar control to help prevent podiatric complications of diabetes.  Patient to return 4 months or sooner if needed.          Angelia Ndiaye DPM  [facility]

## 2024-10-15 ENCOUNTER — OFFICE VISIT (OUTPATIENT)
Dept: PODIATRY | Facility: CLINIC | Age: 74
End: 2024-10-15
Payer: MEDICARE

## 2024-10-15 VITALS — BODY MASS INDEX: 26.67 KG/M2 | HEIGHT: 65 IN | WEIGHT: 160.06 LBS

## 2024-10-15 DIAGNOSIS — E11.9 ENCOUNTER FOR COMPREHENSIVE DIABETIC FOOT EXAMINATION, TYPE 2 DIABETES MELLITUS: Primary | ICD-10-CM

## 2024-10-15 DIAGNOSIS — L40.3 PALMOPLANTAR PUSTULAR PSORIASIS: ICD-10-CM

## 2024-10-15 DIAGNOSIS — E11.21 TYPE 2 DIABETES MELLITUS WITH DIABETIC NEPHROPATHY, WITHOUT LONG-TERM CURRENT USE OF INSULIN: ICD-10-CM

## 2024-10-15 PROCEDURE — 3288F FALL RISK ASSESSMENT DOCD: CPT | Mod: CPTII,S$GLB,, | Performed by: PODIATRIST

## 2024-10-15 PROCEDURE — 1101F PT FALLS ASSESS-DOCD LE1/YR: CPT | Mod: CPTII,S$GLB,, | Performed by: PODIATRIST

## 2024-10-15 PROCEDURE — 1160F RVW MEDS BY RX/DR IN RCRD: CPT | Mod: CPTII,S$GLB,, | Performed by: PODIATRIST

## 2024-10-15 PROCEDURE — 3008F BODY MASS INDEX DOCD: CPT | Mod: CPTII,S$GLB,, | Performed by: PODIATRIST

## 2024-10-15 PROCEDURE — 3044F HG A1C LEVEL LT 7.0%: CPT | Mod: CPTII,S$GLB,, | Performed by: PODIATRIST

## 2024-10-15 PROCEDURE — 99213 OFFICE O/P EST LOW 20 MIN: CPT | Mod: S$GLB,,, | Performed by: PODIATRIST

## 2024-10-15 PROCEDURE — 1159F MED LIST DOCD IN RCRD: CPT | Mod: CPTII,S$GLB,, | Performed by: PODIATRIST

## 2024-10-15 PROCEDURE — 99999 PR PBB SHADOW E&M-EST. PATIENT-LVL III: CPT | Mod: PBBFAC,,, | Performed by: PODIATRIST

## 2024-10-15 PROCEDURE — 1126F AMNT PAIN NOTED NONE PRSNT: CPT | Mod: CPTII,S$GLB,, | Performed by: PODIATRIST

## 2024-10-17 NOTE — PROGRESS NOTES
Subjective:     Patient ID: Basia Seth is a 74 y.o. female.    Chief Complaint: Nail Care (Diabetic pt wears flats, PCP Dr. Tamayo last seen 5-30-24)    Basia is a 74 y.o. female who presents to the clinic upon referral from Dr. Elizabeth bernard. provider found  for evaluation and treatment of diabetic feet. Basia has a past medical history of Cataract, Diabetes mellitus, type 2, GERD (gastroesophageal reflux disease), and Hypertension. Patient presents today for foot check. Patient states her psoriasis has continued to improve and completed Cosentyx as prescribed by her dermatologist. Patient has no other pedal complaints at this time.    PCP: Belkis Jefferson MD    Date Last Seen by PCP: 05/30/2024      Current shoe gear: Casual shoes    Hemoglobin A1C   Date Value Ref Range Status   02/17/2024 6.0 (H) <5.7 % of total Hgb Final     Comment:     For someone without known diabetes, a hemoglobin   A1c value between 5.7% and 6.4% is consistent with  prediabetes and should be confirmed with a   follow-up test.    For someone with known diabetes, a value <7%  indicates that their diabetes is well controlled. A1c  targets should be individualized based on duration of  diabetes, age, comorbid conditions, and other  considerations.    This assay result is consistent with an increased risk  of diabetes.    Currently, no consensus exists regarding use of  hemoglobin A1c for diagnosis of diabetes for children.    HbA1c performed on Roche platform.  Effective 11/13/23 a change in test platforms may have   shifted HbA1c results compared to historical results.       08/07/2023 6.0 (H) <5.7 % of total Hgb Final     Comment:     For someone without known diabetes, a hemoglobin   A1c value between 5.7% and 6.4% is consistent with  prediabetes and should be confirmed with a   follow-up test.    For someone with known diabetes, a value <7%  indicates that their diabetes is well controlled. A1c  targets should be individualized based on  duration of  diabetes, age, comorbid conditions, and other  considerations.    This assay result is consistent with an increased risk  of diabetes.    Currently, no consensus exists regarding use of  hemoglobin A1c for diagnosis of diabetes for children.   04/06/2023 6.7 (H) <5.7 % of total Hgb Final     Comment:     For someone without known diabetes, a hemoglobin A1c  value of 6.5% or greater indicates that they may have   diabetes and this should be confirmed with a follow-up   test.    For someone with known diabetes, a value <7% indicates   that their diabetes is well controlled and a value   greater than or equal to 7% indicates suboptimal   control. A1c targets should be individualized based on   duration of diabetes, age, comorbid conditions, and   other considerations.    Currently, no consensus exists regarding use of  hemoglobin A1c for diagnosis of diabetes for children.             Patient Active Problem List   Diagnosis    GERD (gastroesophageal reflux disease)    Hypertension    Cataract       Medication List with Changes/Refills   Current Medications    ACITRETIN (SORIATANE) 10 MG CAPSULE    Take 2 capsules (20 mg total) by mouth once daily.    FLUOCINOLONE (DERMA-SMOOTHE) 0.01 % EXTERNAL OIL    APPLY TO AFFECTED AREA ON SKIN TWICE A DAY AS NEEDED    MOUNJARO 5 MG/0.5 ML PNIJ    INJECT 5 MG AS DIRECTED EVERY 7 DAYS    NIFEDIPINE (PROCARDIA-XL) 90 MG (OSM) 24 HR TABLET    Take 90 mg by mouth.    POLYETHYLENE GLYCOL (GLYCOLAX) 17 GRAM/DOSE POWDER    Take 17 g by mouth.       Review of patient's allergies indicates:   Allergen Reactions    Tree and shrub pollen Other (See Comments)     .       History reviewed. No pertinent surgical history.    Family History   Problem Relation Name Age of Onset    Hypertension Mother      Hypertension Father         Social History     Socioeconomic History    Marital status:    Tobacco Use    Smoking status: Never     Passive exposure: Never    Smokeless  "tobacco: Never   Substance and Sexual Activity    Alcohol use: Not Currently    Drug use: Never     Social Drivers of Health     Financial Resource Strain: Low Risk  (8/26/2024)    Received from [facility]    Overall Financial Resource Strain (CARDIA)     Difficulty of Paying Living Expenses: Not hard at all   Food Insecurity: No Food Insecurity (8/26/2024)    Received from [facility]    Hunger Vital Sign     Worried About Running Out of Food in the Last Year: Never true     Ran Out of Food in the Last Year: Never true   Transportation Needs: No Transportation Needs (8/26/2024)    Received from [facility]    PRAPARE - Transportation     Lack of Transportation (Medical): No     Lack of Transportation (Non-Medical): No   Physical Activity: Sufficiently Active (8/26/2024)    Received from [facility]    Exercise Vital Sign     Days of Exercise per Week: 4 days     Minutes of Exercise per Session: 60 min   Stress: No Stress Concern Present (8/26/2024)    Received from [facility]    [employer] - Occupational Stress Questionnaire     Feeling of Stress : Not at all   Housing Stability: Low Risk  (1/26/2024)    [facility], [facility]    Housing Stability Vital Sign     Unable to Pay for Housing in the Last Year: No     Number of Places Lived in the Last Year: 1     Unstable Housing in the Last Year: No       Vitals:    10/15/24 1349   Weight: 72.6 kg (160 lb 0.9 oz)   Height: 5' 5" (1.651 m)   PainSc: 0-No pain       Hemoglobin A1C   Date Value Ref Range Status   02/17/2024 6.0 (H) <5.7 % of total Hgb Final     Comment:     For someone without known diabetes, a hemoglobin   A1c value between 5.7% and 6.4% is consistent with  prediabetes and should be confirmed with a   follow-up test.    For someone with known diabetes, a value <7%  indicates that their diabetes is well controlled. " A1c  targets should be individualized based on duration of  diabetes, age, comorbid conditions, and other  considerations.    This assay result is consistent with an increased risk  of diabetes.    Currently, no consensus exists regarding use of  hemoglobin A1c for diagnosis of diabetes for children.    HbA1c performed on Roche platform.  Effective 11/13/23 a change in test platforms may have   shifted HbA1c results compared to historical results.       08/07/2023 6.0 (H) <5.7 % of total Hgb Final     Comment:     For someone without known diabetes, a hemoglobin   A1c value between 5.7% and 6.4% is consistent with  prediabetes and should be confirmed with a   follow-up test.    For someone with known diabetes, a value <7%  indicates that their diabetes is well controlled. A1c  targets should be individualized based on duration of  diabetes, age, comorbid conditions, and other  considerations.    This assay result is consistent with an increased risk  of diabetes.    Currently, no consensus exists regarding use of  hemoglobin A1c for diagnosis of diabetes for children.   04/06/2023 6.7 (H) <5.7 % of total Hgb Final     Comment:     For someone without known diabetes, a hemoglobin A1c  value of 6.5% or greater indicates that they may have   diabetes and this should be confirmed with a follow-up   test.    For someone with known diabetes, a value <7% indicates   that their diabetes is well controlled and a value   greater than or equal to 7% indicates suboptimal   control. A1c targets should be individualized based on   duration of diabetes, age, comorbid conditions, and   other considerations.    Currently, no consensus exists regarding use of  hemoglobin A1c for diagnosis of diabetes for children.           Review of Systems   Constitutional:  Negative for chills and fever.   Respiratory:  Negative for shortness of breath.    Cardiovascular:  Negative for chest pain, palpitations, orthopnea, claudication and leg  swelling.   Gastrointestinal:  Negative for diarrhea, nausea and vomiting.   Musculoskeletal:  Negative for joint pain.   Skin:  Negative for rash.   Neurological:  Negative for dizziness, tingling, sensory change, focal weakness and weakness.   Psychiatric/Behavioral: Negative.               Objective:      PHYSICAL EXAM: Apperance: Alert and orient in no distress,well developed, and with good attention to grooming and body habits  Patient presents ambulating in casual shoes.  Lower Extremity Exam  VASCULAR: Dorsalis pedis pulses 2/4 bilateral and Posterior Tibial pulses 2/4 bilateral. Capillary fill time <4 seconds bilateral. No edema observed bilateral. Skin temperature of the lower extremities is warm to warm, proximal to distal. Hair growth WNL bilateral.  DERMATOLOGICAL: No skin rash, subcutaneous nodules, lesions or ulcers observed. Minimal dry and scaly skin noted plantarly bilateral in moccasin distribution. Minimal dry skin fissures noted to bilateral heels. Webspaces 1,2,3,4 bilateral are clean, dry and without evidence of break in skin integrity.  Nails 1 bilateral decreased  thickened, and discolored with subungual debris. Nails 2,3,4,5 bilateral normal length and thickness.   NEUROLOGICAL: Light touch, sharp-dull, proprioception all present and equal bilaterally.  Vibratory sensation intact at bilateral hallux. Protective sensation intact at all 10 at sites as tested with a [place] 5.07 monofilament.   MUSCULOSKELETAL: Muscle strength is 5/5 for foot inverters, everters, plantarflexors, and dorsiflexors. Muscle tone is normal. No pain on palpation of right hallux nail.        Assessment:       ICD-10-CM ICD-9-CM   1. Encounter for comprehensive diabetic foot examination, type 2 diabetes mellitus  E11.9 250.00   2. Type 2 diabetes mellitus with diabetic nephropathy, without long-term current use of insulin  E11.21 250.40     583.81   3. Palmoplantar pustular psoriasis  L40.3 696.1         Plan:    Encounter for comprehensive diabetic foot examination, type 2 diabetes mellitus    Type 2 diabetes mellitus with diabetic nephropathy, without long-term current use of insulin    Palmoplantar pustular psoriasis    I counseled the patient on her conditions, regarding findings of my examination, my impressions, and usual treatment plan.   Appointment spent on education about the diabetic foot, neuropathy, and prevention of limb loss.  Shoe inspection. Diabetic Foot Education. Patient reminded of the importance of good nutrition and blood sugar control to help prevent podiatric complications of diabetes. Patient instructed on proper foot hygeine. We discussed wearing proper shoe gear, daily foot inspections, never walking without protective shoe gear, never putting sharp instruments to feet.    Patient instructed to continue Cosentyx as prescribed by dermatology.    Patient  will continue to monitor the areas daily, inspect feet, wear protective shoe gear when ambulatory, moisturizer to maintain skin integrity. Patient reminded of the importance of good nutrition and blood sugar control to help prevent podiatric complications of diabetes.  Patient to return 4 months or sooner if needed.          Angelia Ndiaye DPM  [facility]

## 2025-04-15 ENCOUNTER — PATIENT MESSAGE (OUTPATIENT)
Dept: NEUROLOGY | Facility: CLINIC | Age: 75
End: 2025-04-15
Payer: MEDICARE

## 2025-06-03 ENCOUNTER — OFFICE VISIT (OUTPATIENT)
Dept: PODIATRY | Facility: CLINIC | Age: 75
End: 2025-06-03
Payer: MEDICARE

## 2025-06-03 VITALS — WEIGHT: 160.06 LBS | BODY MASS INDEX: 26.67 KG/M2 | HEIGHT: 65 IN

## 2025-06-03 DIAGNOSIS — E11.9 ENCOUNTER FOR COMPREHENSIVE DIABETIC FOOT EXAMINATION, TYPE 2 DIABETES MELLITUS: Primary | ICD-10-CM

## 2025-06-03 DIAGNOSIS — L40.3 PALMOPLANTAR PUSTULAR PSORIASIS: ICD-10-CM

## 2025-06-03 DIAGNOSIS — E11.21 TYPE 2 DIABETES MELLITUS WITH DIABETIC NEPHROPATHY, WITHOUT LONG-TERM CURRENT USE OF INSULIN: ICD-10-CM

## 2025-06-03 DIAGNOSIS — B35.1 DERMATOPHYTOSIS OF NAIL: ICD-10-CM

## 2025-06-03 PROCEDURE — 1159F MED LIST DOCD IN RCRD: CPT | Mod: CPTII,S$GLB,, | Performed by: PODIATRIST

## 2025-06-03 PROCEDURE — 1101F PT FALLS ASSESS-DOCD LE1/YR: CPT | Mod: CPTII,S$GLB,, | Performed by: PODIATRIST

## 2025-06-03 PROCEDURE — 4010F ACE/ARB THERAPY RXD/TAKEN: CPT | Mod: CPTII,S$GLB,, | Performed by: PODIATRIST

## 2025-06-03 PROCEDURE — 99213 OFFICE O/P EST LOW 20 MIN: CPT | Mod: S$GLB,,, | Performed by: PODIATRIST

## 2025-06-03 PROCEDURE — 1160F RVW MEDS BY RX/DR IN RCRD: CPT | Mod: CPTII,S$GLB,, | Performed by: PODIATRIST

## 2025-06-03 PROCEDURE — 3288F FALL RISK ASSESSMENT DOCD: CPT | Mod: CPTII,S$GLB,, | Performed by: PODIATRIST

## 2025-06-03 PROCEDURE — 99999 PR PBB SHADOW E&M-EST. PATIENT-LVL III: CPT | Mod: PBBFAC,,, | Performed by: PODIATRIST

## 2025-06-03 PROCEDURE — 3044F HG A1C LEVEL LT 7.0%: CPT | Mod: CPTII,S$GLB,, | Performed by: PODIATRIST
